# Patient Record
Sex: FEMALE | Race: WHITE | Employment: OTHER | URBAN - METROPOLITAN AREA
[De-identification: names, ages, dates, MRNs, and addresses within clinical notes are randomized per-mention and may not be internally consistent; named-entity substitution may affect disease eponyms.]

---

## 2018-06-07 ENCOUNTER — HOSPITAL ENCOUNTER (EMERGENCY)
Age: 83
Discharge: HOME | End: 2018-06-08
Payer: MEDICARE

## 2018-06-07 DIAGNOSIS — B36.9 FUNGAL DERMATITIS: ICD-10-CM

## 2018-06-07 DIAGNOSIS — I87.2 CHRONIC VENOUS STASIS DERMATITIS OF BOTH LOWER EXTREMITIES: Primary | ICD-10-CM

## 2018-06-07 DIAGNOSIS — L03.039 ONYCHIA OF TOE, UNSPECIFIED LATERALITY: ICD-10-CM

## 2018-06-07 LAB
ALBUMIN SERPL-MCNC: 3 G/DL (ref 3.2–4.6)
ALBUMIN/GLOB SERPL: 0.7 {RATIO} (ref 1.2–3.5)
ALP SERPL-CCNC: 83 U/L (ref 50–136)
ALT SERPL-CCNC: 17 U/L (ref 12–65)
ANION GAP SERPL CALC-SCNC: 3 MMOL/L (ref 7–16)
AST SERPL-CCNC: 23 U/L (ref 15–37)
BASOPHILS # BLD: 0.1 K/UL (ref 0–0.2)
BASOPHILS NFR BLD: 1 % (ref 0–2)
BILIRUB SERPL-MCNC: 0.2 MG/DL (ref 0.2–1.1)
BUN SERPL-MCNC: 16 MG/DL (ref 8–23)
CALCIUM SERPL-MCNC: 9 MG/DL (ref 8.3–10.4)
CHLORIDE SERPL-SCNC: 106 MMOL/L (ref 98–107)
CO2 SERPL-SCNC: 34 MMOL/L (ref 21–32)
CREAT SERPL-MCNC: 0.7 MG/DL (ref 0.6–1)
D DIMER PPP FEU-MCNC: 1.75 UG/ML(FEU)
DIFFERENTIAL METHOD BLD: ABNORMAL
EOSINOPHIL # BLD: 0.2 K/UL (ref 0–0.8)
EOSINOPHIL NFR BLD: 2 % (ref 0.5–7.8)
ERYTHROCYTE [DISTWIDTH] IN BLOOD BY AUTOMATED COUNT: 13.8 % (ref 11.9–14.6)
GLOBULIN SER CALC-MCNC: 4.1 G/DL (ref 2.3–3.5)
GLUCOSE SERPL-MCNC: 115 MG/DL (ref 65–100)
HCT VFR BLD AUTO: 37.9 % (ref 35.8–46.3)
HGB BLD-MCNC: 11.7 G/DL (ref 11.7–15.4)
IMM GRANULOCYTES # BLD: 0 K/UL (ref 0–0.5)
IMM GRANULOCYTES NFR BLD AUTO: 0 % (ref 0–5)
LYMPHOCYTES # BLD: 1.8 K/UL (ref 0.5–4.6)
LYMPHOCYTES NFR BLD: 24 % (ref 13–44)
MCH RBC QN AUTO: 27.2 PG (ref 26.1–32.9)
MCHC RBC AUTO-ENTMCNC: 30.9 G/DL (ref 31.4–35)
MCV RBC AUTO: 88.1 FL (ref 79.6–97.8)
MONOCYTES # BLD: 0.7 K/UL (ref 0.1–1.3)
MONOCYTES NFR BLD: 9 % (ref 4–12)
NEUTS SEG # BLD: 4.8 K/UL (ref 1.7–8.2)
NEUTS SEG NFR BLD: 64 % (ref 43–78)
PLATELET # BLD AUTO: 275 K/UL (ref 150–450)
PMV BLD AUTO: 9.9 FL (ref 10.8–14.1)
POTASSIUM SERPL-SCNC: 3.9 MMOL/L (ref 3.5–5.1)
PROT SERPL-MCNC: 7.1 G/DL (ref 6.3–8.2)
RBC # BLD AUTO: 4.3 M/UL (ref 4.05–5.25)
SODIUM SERPL-SCNC: 143 MMOL/L (ref 136–145)
WBC # BLD AUTO: 7.5 K/UL (ref 4.3–11.1)

## 2018-06-07 PROCEDURE — 99285 EMERGENCY DEPT VISIT HI MDM: CPT

## 2018-06-07 PROCEDURE — 80053 COMPREHEN METABOLIC PANEL: CPT | Performed by: EMERGENCY MEDICINE

## 2018-06-07 PROCEDURE — 85379 FIBRIN DEGRADATION QUANT: CPT

## 2018-06-07 PROCEDURE — 85025 COMPLETE CBC W/AUTO DIFF WBC: CPT | Performed by: EMERGENCY MEDICINE

## 2018-06-07 PROCEDURE — 81003 URINALYSIS AUTO W/O SCOPE: CPT

## 2018-06-08 ENCOUNTER — APPOINTMENT (OUTPATIENT)
Dept: ULTRASOUND IMAGING | Age: 83
End: 2018-06-08
Payer: MEDICARE

## 2018-06-08 VITALS
OXYGEN SATURATION: 93 % | HEART RATE: 88 BPM | TEMPERATURE: 97.6 F | RESPIRATION RATE: 16 BRPM | SYSTOLIC BLOOD PRESSURE: 112 MMHG | DIASTOLIC BLOOD PRESSURE: 60 MMHG

## 2018-06-08 PROCEDURE — 93970 EXTREMITY STUDY: CPT

## 2018-06-08 RX ORDER — CEPHALEXIN 500 MG/1
500 CAPSULE ORAL 4 TIMES DAILY
Qty: 28 CAP | Refills: 0 | Status: SHIPPED | OUTPATIENT
Start: 2018-06-08 | End: 2018-06-15

## 2018-06-08 RX ORDER — FLUCONAZOLE 150 MG/1
150 TABLET ORAL
Qty: 2 TAB | Refills: 0 | Status: SHIPPED | OUTPATIENT
Start: 2018-06-08 | End: 2018-06-08

## 2018-06-08 NOTE — DISCHARGE INSTRUCTIONS
Cellulitis: Care Instructions  Your Care Instructions    Cellulitis is a skin infection. It often occurs after a break in the skin from a scrape, cut, bite, or puncture, or after a rash. The doctor has checked you carefully, but problems can develop later. If you notice any problems or new symptoms, get medical treatment right away. Follow-up care is a key part of your treatment and safety. Be sure to make and go to all appointments, and call your doctor if you are having problems. It's also a good idea to know your test results and keep a list of the medicines you take. How can you care for yourself at home? · Take your antibiotics as directed. Do not stop taking them just because you feel better. You need to take the full course of antibiotics. · Prop up the infected area on pillows to reduce pain and swelling. Try to keep the area above the level of your heart as often as you can. · If your doctor told you how to care for your wound, follow your doctor's instructions. If you did not get instructions, follow this general advice:  ¨ Wash the wound with clean water 2 times a day. Don't use hydrogen peroxide or alcohol, which can slow healing. ¨ You may cover the wound with a thin layer of petroleum jelly, such as Vaseline, and a nonstick bandage. ¨ Apply more petroleum jelly and replace the bandage as needed. · Be safe with medicines. Take pain medicines exactly as directed. ¨ If the doctor gave you a prescription medicine for pain, take it as prescribed. ¨ If you are not taking a prescription pain medicine, ask your doctor if you can take an over-the-counter medicine. To prevent cellulitis in the future  · Try to prevent cuts, scrapes, or other injuries to your skin. Cellulitis most often occurs where there is a break in the skin. · If you get a scrape, cut, mild burn, or bite, wash the wound with clean water as soon as you can to help avoid infection.  Don't use hydrogen peroxide or alcohol, which can slow healing. · If you have swelling in your legs (edema), support stockings and good skin care may help prevent leg sores and cellulitis. · Take care of your feet, especially if you have diabetes or other conditions that increase the risk of infection. Wear shoes and socks. Do not go barefoot. If you have athlete's foot or other skin problems on your feet, talk to your doctor about how to treat them. When should you call for help? Call your doctor now or seek immediate medical care if:  ? · You have signs that your infection is getting worse, such as:  ¨ Increased pain, swelling, warmth, or redness. ¨ Red streaks leading from the area. ¨ Pus draining from the area. ¨ A fever. ? · You get a rash. ? Watch closely for changes in your health, and be sure to contact your doctor if:  ? · You are not getting better after 1 day (24 hours). ? · You do not get better as expected. Where can you learn more? Go to http://millie-patrice.info/. Parish Brown in the search box to learn more about \"Cellulitis: Care Instructions. \"  Current as of: October 13, 2016  Content Version: 11.4  © 9789-7365 [employer]. Care instructions adapted under license by [employer] (which disclaims liability or warranty for this information). If you have questions about a medical condition or this instruction, always ask your healthcare professional. [place] [address] any warranty or liability for your use of this information.

## 2018-06-08 NOTE — ED NOTES
I have reviewed discharge instructions with the patient. The patient verbalized understanding. Patient left ED via Discharge Method: stretcher to Home with (, transport). Opportunity for questions and clarification provided. Patient given 2 scripts. To continue your aftercare when you leave the hospital, you may receive an automated call from our care team to check in on how you are doing. This is a free service and part of our promise to provide the best care and service to meet your aftercare needs.  If you have questions, or wish to unsubscribe from this service please call [phone]. Thank you for Choosing our [facility].

## 2018-06-08 NOTE — ED NOTES
Pt presents to the ED for bilateral leg pain and swelling for several days.  Pt states that she is having a difficulty time ambulating

## 2018-06-08 NOTE — ED PROVIDER NOTES
HPI Comments: 80-year-old female complaining of lower extremity pain and headache. Patient states that she's had a lower extremity pain first several days if not weeks. She is also noted swelling and redness to her lower extremities. There is long-term vascular insufficiency findings on her legs. Patient also complains of a headache which she's had for several days. No reported treatment at home. Patient lives with her brother in private home in [facility]. No recent falls or injuries no reported fevers or chills no nausea vomiting or diarrhea shortness of breath. Patient is a 80 y.o. female presenting with leg pain and headaches. The history is provided by the patient. Leg Pain    This is a new problem. The current episode started more than 1 week ago. The problem occurs constantly. The problem has not changed since onset. The pain is present in the left lower leg, left ankle, left foot, right ankle, right foot and right lower leg. The quality of the pain is described as aching. She has tried nothing for the symptoms. There has been no history of extremity trauma. Headache           No past medical history on file. No past surgical history on file. No family history on file. Social History     Social History    Marital status: N/A     Spouse name: N/A    Number of children: N/A    Years of education: N/A     Occupational History    Not on file. Social History Main Topics    Smoking status: Not on file    Smokeless tobacco: Not on file    Alcohol use Not on file    Drug use: Not on file    Sexual activity: Not on file     Other Topics Concern    Not on file     Social History Narrative         ALLERGIES: Review of patient's allergies indicates no known allergies. Review of Systems   Constitutional: Negative. Negative for activity change. HENT: Negative. Eyes: Negative. Respiratory: Negative. Cardiovascular: Negative. Gastrointestinal: Negative.     Genitourinary: Negative. Musculoskeletal: Negative. Skin: Negative. Neurological: Positive for headaches. Psychiatric/Behavioral: Negative. All other systems reviewed and are negative. Vitals:    06/07/18 2241   BP: 169/69   Pulse: 89   Resp: 20   Temp: 97.6 °F (36.4 °C)   SpO2: 99%            Physical Exam   Constitutional: She is oriented to person, place, and time. She appears well-developed and well-nourished. No distress. HENT:   Head: Normocephalic and atraumatic. Right Ear: External ear normal.   Left Ear: External ear normal.   Nose: Nose normal.   Mouth/Throat: Oropharynx is clear and moist. No oropharyngeal exudate. Eyes: Conjunctivae and EOM are normal. Pupils are equal, round, and reactive to light. Right eye exhibits no discharge. Left eye exhibits no discharge. No scleral icterus. Neck: Normal range of motion. Neck supple. No JVD present. No tracheal deviation present. Cardiovascular: Normal rate, regular rhythm and intact distal pulses. Pulmonary/Chest: Effort normal and breath sounds normal. No stridor. No respiratory distress. She has no wheezes. She exhibits no tenderness. Abdominal: Soft. Bowel sounds are normal. She exhibits no distension and no mass. There is no tenderness. Musculoskeletal: Normal range of motion. She exhibits no edema or tenderness. Right ankle: She exhibits swelling. She exhibits normal pulse. Left ankle: She exhibits swelling. She exhibits normal pulse. Feet:    Neurological: She is alert and oriented to person, place, and time. No cranial nerve deficit. Skin: Skin is warm and dry. No rash noted. She is not diaphoretic. No erythema. No pallor. Psychiatric: She has a normal mood and affect. Her behavior is normal. Thought content normal.   Nursing note and vitals reviewed.        MDM  Number of Diagnoses or Management Options  Diagnosis management comments: Assessment: Chronic dermatitis of the lower extremities possible fungal component and cellulitis. Plan: Antibiotics antifungals close follow-up primary care physician.        Amount and/or Complexity of Data Reviewed  Clinical lab tests: ordered and reviewed  Tests in the radiology section of CPT®: ordered and reviewed  Tests in the medicine section of CPT®: ordered and reviewed          ED Course       Procedures

## 2018-06-08 NOTE — ED TRIAGE NOTES
Pt c/o bilateral leg pain, swelling and redness , pt also c./o terrible headache that started this evening.  Pt denies any medical problems or surgeries

## 2018-07-04 ENCOUNTER — APPOINTMENT (OUTPATIENT)
Dept: GENERAL RADIOLOGY | Age: 83
End: 2018-07-04
Attending: EMERGENCY MEDICINE
Payer: MEDICARE

## 2018-07-04 ENCOUNTER — HOSPITAL ENCOUNTER (EMERGENCY)
Age: 83
Discharge: HOME | End: 2018-07-04
Attending: EMERGENCY MEDICINE
Payer: MEDICARE

## 2018-07-04 VITALS
HEART RATE: 94 BPM | BODY MASS INDEX: 20.49 KG/M2 | WEIGHT: 120 LBS | DIASTOLIC BLOOD PRESSURE: 71 MMHG | SYSTOLIC BLOOD PRESSURE: 159 MMHG | OXYGEN SATURATION: 100 % | TEMPERATURE: 98.5 F | HEIGHT: 64 IN | RESPIRATION RATE: 20 BRPM

## 2018-07-04 DIAGNOSIS — L30.9 CHRONIC DERMATITIS: ICD-10-CM

## 2018-07-04 DIAGNOSIS — S20.211A RIB CONTUSION, RIGHT, INITIAL ENCOUNTER: Primary | ICD-10-CM

## 2018-07-04 DIAGNOSIS — R03.0 ELEVATED BLOOD PRESSURE READING: ICD-10-CM

## 2018-07-04 LAB
ALBUMIN SERPL-MCNC: 3.1 G/DL (ref 3.2–4.6)
ALBUMIN/GLOB SERPL: 0.7 {RATIO} (ref 1.2–3.5)
ALP SERPL-CCNC: 113 U/L (ref 50–136)
ALT SERPL-CCNC: 80 U/L (ref 12–65)
ANION GAP SERPL CALC-SCNC: 7 MMOL/L (ref 7–16)
APPEARANCE UR: CLEAR
AST SERPL-CCNC: 162 U/L (ref 15–37)
ATRIAL RATE: 87 BPM
BACTERIA URNS QL MICRO: 0 /HPF
BILIRUB SERPL-MCNC: 0.3 MG/DL (ref 0.2–1.1)
BILIRUB UR QL: NEGATIVE
BUN SERPL-MCNC: 15 MG/DL (ref 8–23)
CALCIUM SERPL-MCNC: 9.2 MG/DL (ref 8.3–10.4)
CALCULATED P AXIS, ECG09: 76 DEGREES
CALCULATED R AXIS, ECG10: -25 DEGREES
CALCULATED T AXIS, ECG11: 31 DEGREES
CHLORIDE SERPL-SCNC: 101 MMOL/L (ref 98–107)
CK SERPL-CCNC: 83 U/L (ref 21–215)
CO2 SERPL-SCNC: 30 MMOL/L (ref 21–32)
COLOR UR: YELLOW
CREAT SERPL-MCNC: 0.65 MG/DL (ref 0.6–1)
DIAGNOSIS, 93000: NORMAL
EPI CELLS #/AREA URNS HPF: ABNORMAL /HPF
ERYTHROCYTE [DISTWIDTH] IN BLOOD BY AUTOMATED COUNT: 14 % (ref 11.9–14.6)
GLOBULIN SER CALC-MCNC: 4.5 G/DL (ref 2.3–3.5)
GLUCOSE SERPL-MCNC: 110 MG/DL (ref 65–100)
GLUCOSE UR STRIP.AUTO-MCNC: NEGATIVE MG/DL
HCT VFR BLD AUTO: 38.6 % (ref 35.8–46.3)
HGB BLD-MCNC: 12.3 G/DL (ref 11.7–15.4)
HGB UR QL STRIP: ABNORMAL
KETONES UR QL STRIP.AUTO: ABNORMAL MG/DL
LEUKOCYTE ESTERASE UR QL STRIP.AUTO: ABNORMAL
MCH RBC QN AUTO: 27.5 PG (ref 26.1–32.9)
MCHC RBC AUTO-ENTMCNC: 31.9 G/DL (ref 31.4–35)
MCV RBC AUTO: 86.2 FL (ref 79.6–97.8)
NITRITE UR QL STRIP.AUTO: NEGATIVE
OTHER OBSERVATIONS,UCOM: ABNORMAL
P-R INTERVAL, ECG05: 166 MS
PH UR STRIP: 7 [PH] (ref 5–9)
PLATELET # BLD AUTO: 278 K/UL (ref 150–450)
PMV BLD AUTO: 9.7 FL (ref 10.8–14.1)
POTASSIUM SERPL-SCNC: 3.6 MMOL/L (ref 3.5–5.1)
PROT SERPL-MCNC: 7.6 G/DL (ref 6.3–8.2)
PROT UR STRIP-MCNC: NEGATIVE MG/DL
Q-T INTERVAL, ECG07: 374 MS
QRS DURATION, ECG06: 82 MS
QTC CALCULATION (BEZET), ECG08: 450 MS
RBC # BLD AUTO: 4.48 M/UL (ref 4.05–5.25)
RBC #/AREA URNS HPF: ABNORMAL /HPF
SODIUM SERPL-SCNC: 138 MMOL/L (ref 136–145)
SP GR UR REFRACTOMETRY: 1.01 (ref 1–1.02)
UROBILINOGEN UR QL STRIP.AUTO: 0.2 EU/DL (ref 0.2–1)
VENTRICULAR RATE, ECG03: 87 BPM
WBC # BLD AUTO: 10 K/UL (ref 4.3–11.1)
WBC URNS QL MICRO: ABNORMAL /HPF

## 2018-07-04 PROCEDURE — 93005 ELECTROCARDIOGRAM TRACING: CPT | Performed by: EMERGENCY MEDICINE

## 2018-07-04 PROCEDURE — 80053 COMPREHEN METABOLIC PANEL: CPT | Performed by: EMERGENCY MEDICINE

## 2018-07-04 PROCEDURE — 82550 ASSAY OF CK (CPK): CPT | Performed by: EMERGENCY MEDICINE

## 2018-07-04 PROCEDURE — 74011250637 HC RX REV CODE- 250/637: Performed by: EMERGENCY MEDICINE

## 2018-07-04 PROCEDURE — 71101 X-RAY EXAM UNILAT RIBS/CHEST: CPT

## 2018-07-04 PROCEDURE — 81001 URINALYSIS AUTO W/SCOPE: CPT | Performed by: EMERGENCY MEDICINE

## 2018-07-04 PROCEDURE — 99285 EMERGENCY DEPT VISIT HI MDM: CPT | Performed by: EMERGENCY MEDICINE

## 2018-07-04 PROCEDURE — 85027 COMPLETE CBC AUTOMATED: CPT | Performed by: EMERGENCY MEDICINE

## 2018-07-04 RX ORDER — SODIUM CHLORIDE 0.9 % (FLUSH) 0.9 %
5-10 SYRINGE (ML) INJECTION AS NEEDED
Status: DISCONTINUED | OUTPATIENT
Start: 2018-07-04 | End: 2018-07-04 | Stop reason: HOSPADM

## 2018-07-04 RX ORDER — ACETAMINOPHEN 325 MG/1
650 TABLET ORAL
Status: COMPLETED | OUTPATIENT
Start: 2018-07-04 | End: 2018-07-04

## 2018-07-04 RX ORDER — AMLODIPINE BESYLATE 5 MG/1
5 TABLET ORAL DAILY
Qty: 30 TAB | Refills: 0 | Status: SHIPPED | OUTPATIENT
Start: 2018-07-04

## 2018-07-04 RX ORDER — SODIUM CHLORIDE 0.9 % (FLUSH) 0.9 %
5-10 SYRINGE (ML) INJECTION EVERY 8 HOURS
Status: DISCONTINUED | OUTPATIENT
Start: 2018-07-04 | End: 2018-07-04 | Stop reason: HOSPADM

## 2018-07-04 RX ADMIN — ACETAMINOPHEN 650 MG: 325 TABLET ORAL at 10:57

## 2018-07-04 NOTE — ED TRIAGE NOTES
Patient arrives from home with EMS with complaint of fall. Patient was sitting on toilet having bowel movement when she fell over to the right hitting her right side ribs on the tub side. Did not hit head.

## 2018-07-04 NOTE — DISCHARGE INSTRUCTIONS
Chest Contusion: Care Instructions  Your Care Instructions  A chest contusion, or bruise, is caused by a fall or direct blow to the chest. Car crashes, falls, getting punched, and injury from bicycle handlebars are common causes of chest contusions. A very forceful blow to the chest can injure the heart or blood vessels in the chest, the lungs, the airway, the liver, or the spleen. Pain may be caused by an injury to muscles, cartilage, or ribs. Deep breathing, coughing, or sneezing can increase your pain. Lying on the injured area also can cause pain. Follow-up care is a key part of your treatment and safety. Be sure to make and go to all appointments, and call your doctor if you are having problems. It's also a good idea to know your test results and keep a list of the medicines you take. How can you care for yourself at home? · Rest and protect the injured or sore area. Stop, change, or take a break from any activity that may be causing your pain. · Put ice or a cold pack on the area for 10 to 20 minutes at a time. Put a thin cloth between the ice and your skin. · After 2 or 3 days, if your swelling is gone, apply a heating pad set on low or a warm cloth to your chest. Some doctors suggest that you go back and forth between hot and cold. Put a thin cloth between the heating pad and your skin. · Do not wrap or tape your ribs for support. This may cause you to take smaller breaths, which could increase your risk of pneumonia and lung collapse. · Ask your doctor if you can take an over-the-counter pain medicine, such as acetaminophen (Tylenol), ibuprofen (Advil, Motrin), or naproxen (Aleve). Be safe with medicines. Read and follow all instructions on the label. · Take your medicines exactly as prescribed. Call your doctor if you think you are having a problem with your medicine.   · Gentle stretching and massage may help you feel better after a few days of rest. Stretch slowly to the point just before discomfort begins, then hold the stretch for at least 15 to 30 seconds. Do this 3 or 4 times per day. · As your pain gets better, slowly return to your normal activities. Be patient, because chest bruises can take weeks or months to heal. Any increased pain may be a sign that you need to rest a while longer. When should you call for help? Call 911 anytime you think you may need emergency care. For example, call if:  ? · You have severe trouble breathing. ? · You cough up blood. ?Call your doctor now or seek immediate medical care if:  ? · You have belly pain. ? · You are dizzy or lightheaded, or you feel like you may faint. ? · You develop new symptoms with the chest pain. ? · Your chest pain gets worse. ? · You have a fever. ? · You have some shortness of breath. ? · You have a cough that brings up mucus from the lungs. ? Watch closely for changes in your health, and be sure to contact your doctor if:  ? · Your chest pain is not improving after 1 week. Where can you learn more? Go to http://millie-patrice.info/. Enter I174 in the search box to learn more about \"Chest Contusion: Care Instructions. \"  Current as of: March 20, 2017  Content Version: 11.4  © 4680-1090 [employer]. Care instructions adapted under license by [employer] (which disclaims liability or warranty for this information). If you have questions about a medical condition or this instruction, always ask your healthcare professional. [place] [address] any warranty or liability for your use of this information. Elevated Blood Pressure: Care Instructions  Your Care Instructions    Blood pressure is a measure of how hard the blood pushes against the walls of your arteries. It's normal for blood pressure to go up and down throughout the day. But if it stays up over time, you have high blood pressure. Two numbers tell you your blood pressure.  The first number is the systolic pressure. It shows how hard the blood pushes when your heart is pumping. The second number is the diastolic pressure. It shows how hard the blood pushes between heartbeats, when your heart is relaxed and filling with blood. An ideal blood pressure in adults is less than 120/80 (say \"120 over 80\"). High blood pressure is 140/90 or higher. You have high blood pressure if your top number is 140 or higher or your bottom number is 90 or higher, or both. The main test for high blood pressure is simple, fast, and painless. To diagnose high blood pressure, your doctor will test your blood pressure at different times. After testing your blood pressure, your doctor may ask you to test it again when you are home. If you are diagnosed with high blood pressure, you can work with your doctor to make a long-term plan to manage it. Follow-up care is a key part of your treatment and safety. Be sure to make and go to all appointments, and call your doctor if you are having problems. It's also a good idea to know your test results and keep a list of the medicines you take. How can you care for yourself at home? · Do not smoke. Smoking increases your risk for heart attack and stroke. If you need help quitting, talk to your doctor about stop-smoking programs and medicines. These can increase your chances of quitting for good. · Stay at a healthy weight. · Try to limit how much sodium you eat to less than 2,300 milligrams (mg) a day. Your doctor may ask you to try to eat less than 1,500 mg a day. · Be physically active. Get at least 30 minutes of exercise on most days of the week. Walking is a good choice. You also may want to do other activities, such as running, swimming, cycling, or playing tennis or team sports. · Avoid or limit alcohol. Talk to your doctor about whether you can drink any alcohol. · Eat plenty of fruits, vegetables, and low-fat dairy products. Eat less saturated and total fats.   · Learn how to check your blood pressure at home. When should you call for help? Call your doctor now or seek immediate medical care if:  ? · Your blood pressure is much higher than normal (such as 180/110 or higher). ? · You think high blood pressure is causing symptoms such as:  ¨ Severe headache. ¨ Blurry vision. ? Watch closely for changes in your health, and be sure to contact your doctor if:  ? · You do not get better as expected. Where can you learn more? Go to http://millie-patrice.info/. Enter J734 in the search box to learn more about \"Elevated Blood Pressure: Care Instructions. \"  Current as of: September 21, 2016  Content Version: 11.4  © 4012-3443 [employer]. Care instructions adapted under license by [employer] (which disclaims liability or warranty for this information). If you have questions about a medical condition or this instruction, always ask your healthcare professional. [place] [address] any warranty or liability for your use of this information. Dermatitis: Care Instructions  Your Care Instructions  Dermatitis is the general name used for any rash or inflammation of the skin. Different kinds of dermatitis cause different kinds of rashes. Common causes of a rash include new medicines, plants (such as poison oak or poison ivy), heat, and stress. Certain illnesses can also cause a rash. An allergic reaction to something that touches your skin, such as latex, nickel, or poison ivy, is called contact dermatitis. Contact dermatitis may also be caused by something that irritates the skin, such as bleach, a chemical, or soap. These types of rashes cannot be spread from person to person. How long your rash will last depends on what caused it. Rashes may last a few days or months. Follow-up care is a key part of your treatment and safety. Be sure to make and go to all appointments, and call your doctor if you are having problems.  It's also a good idea to know your test results and keep a list of the medicines you take. How can you care for yourself at home? · Do not scratch the rash. Cut your nails short, and file them smooth. Or wear gloves if this helps keep you from scratching. · Wash the area with water only. Pat dry. · Put cold, wet cloths on the rash to reduce itching. · Keep cool, and stay out of the sun. · Leave the rash open to the air as much as possible. · If the rash itches, use hydrocortisone cream. Follow the directions on the label. Calamine lotion may help for plant rashes. · Take an over-the-counter antihistamine, such as diphenhydramine (Benadryl) or loratadine (Claritin), to help calm the itching. Read and follow all instructions on the label. · If your doctor prescribed a cream, use it as directed. If your doctor prescribed medicine, take it exactly as directed. When should you call for help? Call your doctor now or seek immediate medical care if:  ? · You have symptoms of infection, such as:  ¨ Increased pain, swelling, warmth, or redness. ¨ Red streaks leading from the area. ¨ Pus draining from the area. ¨ A fever. ? · You have joint pain along with the rash. ? Watch closely for changes in your health, and be sure to contact your doctor if:  ? · Your rash is changing or getting worse. ? · You are not getting better as expected. Where can you learn more? Go to http://millie-patrice.info/. Enter [phone] [id number] in the search box to learn more about \"Dermatitis: Care Instructions. \"  Current as of: October 13, 2016  Content Version: 11.4  © 4809-4630 [employer]. Care instructions adapted under license by [employer] (which disclaims liability or warranty for this information).  If you have questions about a medical condition or this instruction, always ask your healthcare professional. [address]  any warranty or liability for your use of this information.

## 2018-07-04 NOTE — ED NOTES
Patient report given to [employer]. All questions answered. Patient was transferred from ER bed to ambulance stretcher. Patient care transferred.

## 2018-07-04 NOTE — ED NOTES
[facility] has been called to transport patient back home. They state that it will be approximately 30 minutes.

## 2018-07-04 NOTE — ED PROVIDER NOTES
Patient is a 80 y.o. female presenting with fall. The history is provided by the patient. Fall   The accident occurred 3 to 5 hours ago. Fall occurred: sitting on the toilet. She fell from a height of 1 - 2 ft. Impact surface: edge of the bathtub. Point of impact: right anterolateral rib area. Pain location: right ribs. The pain is at a severity of 6/10. She was not ambulatory at the scene. There was entrapment (patient thinks she was only entrapped for maybe 30 minutes, but states she thought it occurred at around 5:30 in the morning) after the fall. There was no drug use involved in the accident. There was no alcohol use involved in the accident. Pertinent negatives include no numbness, no abdominal pain, no nausea, no vomiting, no headaches and no loss of consciousness. The symptoms are aggravated by activity. She has tried nothing for the symptoms. Past Medical History:   Diagnosis Date    Gastrointestinal disorder     GERD    Ill-defined condition     cellulitis of legs       History reviewed. No pertinent surgical history. History reviewed. No pertinent family history. Social History     Social History    Marital status: UNKNOWN     Spouse name: N/A    Number of children: N/A    Years of education: N/A     Occupational History    Not on file. Social History Main Topics    Smoking status: Never Smoker    Smokeless tobacco: Never Used    Alcohol use No    Drug use: No    Sexual activity: Not on file     Other Topics Concern    Not on file     Social History Narrative    No narrative on file         ALLERGIES: Review of patient's allergies indicates no known allergies. Review of Systems   HENT: Negative for facial swelling. Respiratory: Negative for shortness of breath. Cardiovascular: Negative for chest pain. Gastrointestinal: Negative for abdominal pain, nausea and vomiting. Musculoskeletal: Negative for back pain and neck pain.    Neurological: Negative for loss of consciousness, weakness, numbness and headaches. Vitals:    07/04/18 0948   BP: 200/81   Pulse: 93   Resp: 18   Temp: 98.7 °F (37.1 °C)   SpO2: 99%   Weight: 54.4 kg (120 lb)   Height: 5' 4\" (1.626 m)            Physical Exam   Constitutional: She is oriented to person, place, and time. Patient appears disheveled there is what appears to be food particles in her hair   HENT:   Head: Normocephalic and atraumatic. Eyes: Conjunctivae and EOM are normal. Pupils are equal, round, and reactive to light. Neck: Trachea normal. Neck supple. No spinous process tenderness and no muscular tenderness present. Cardiovascular: Normal rate, regular rhythm, normal heart sounds and intact distal pulses. Pulmonary/Chest: Effort normal and breath sounds normal. She exhibits no tenderness. Abdominal: Soft. Bowel sounds are normal. She exhibits no distension. There is no tenderness. There is no rebound and no guarding. Musculoskeletal: Normal range of motion. She exhibits edema. Cervical back: She exhibits tenderness. Chronic appearing edema and chronic dermatitis. There is a dorsal left foot wound which is caked and covered with paper towels. No warmth or erythema. No purulent discharge. There is dark scabbing material at the periphery. Lymphadenopathy:     She has no cervical adenopathy. Neurological: She is alert and oriented to person, place, and time. She has normal strength. No sensory deficit. GCS eye subscore is 4. GCS verbal subscore is 5. GCS motor subscore is 6. Skin: Skin is warm and dry. Nursing note and vitals reviewed. MDM  Number of Diagnoses or Management Options  Diagnosis management comments: Patient is hypertensive upon arrival.  She is disheveled. She is oriented ×3 however. Her story of falling at 5:30 in the morning but arriving here after 9:30 AM and only laying on the floor for 30 minutes does not quite add up so I will evaluate for rhabdomyolysis.   Evaluate for end organ damage given his systolic pressure of 384. May need social work consult for  to evaluate the living circumstances given her disheveled appearance. 12:31 PM  No UTI. Home health comes out for 8 hours a day 4 hours in the morning and 4 hours in the evening 7 days a week. Patient has recently started refusing their help. They are primarily there to help her brother and his wife but do help her. She is seen a doctor in the past who is treating her for  Skin disorder fungal infection but she has refused to go. She has recently had a palliative care consult at the home as well. Social work will help us find primary care follow-up for blood pressure recheck is I will prescribe her blood pressure medicine. No rib fractures And no pneumothorax. Tylenol for pain and ice to the sore area will be recommended.        Amount and/or Complexity of Data Reviewed  Clinical lab tests: ordered and reviewed (Results for orders placed or performed during the hospital encounter of 07/04/18  -CBC W/O DIFF       Result                                            Value                         Ref Range                       WBC                                               10.0                          4.3 - 11.1 K/uL                 RBC                                               4.48                          4.05 - 5.25 M/uL                HGB                                               12.3                          11.7 - 15.4 g/dL                HCT                                               38.6                          35.8 - 46.3 %                   MCV                                               86.2                          79.6 - 97.8 FL                  MCH                                               27.5                          26.1 - 32.9 PG                  MCHC                                              31.9                          31.4 - 35.0 g/dL                RDW 14.0                          11.9 - 14.6 %                   PLATELET                                          278                           150 - 450 K/uL                  MPV                                               9.7 (L)                       10.8 - 14.1 FL             -METABOLIC PANEL, COMPREHENSIVE       Result                                            Value                         Ref Range                       Sodium                                            138                           136 - 145 mmol/L                Potassium                                         3.6                           3.5 - 5.1 mmol/L                Chloride                                          101                           98 - 107 mmol/L                 CO2                                               30                            21 - 32 mmol/L                  Anion gap                                         7                             7 - 16 mmol/L                   Glucose                                           110 (H)                       65 - 100 mg/dL                  BUN                                               15                            8 - 23 MG/DL                    Creatinine                                        0.65                          0.6 - 1.0 MG/DL                 GFR est AA                                        >60                           >60 ml/min/1.73m2               GFR est non-AA                                    >60                           >60 ml/min/1.73m2               Calcium                                           9.2                           8.3 - 10.4 MG/DL                Bilirubin, total                                  0.3                           0.2 - 1.1 MG/DL                 ALT (SGPT)                                        80 (H)                        12 - 65 U/L                     AST (SGOT) 162 (H)                       15 - 37 U/L                     Alk. phosphatase                                  113                           50 - 136 U/L                    Protein, total                                    7.6                           6.3 - 8.2 g/dL                  Albumin                                           3.1 (L)                       3.2 - 4.6 g/dL                  Globulin                                          4.5 (H)                       2.3 - 3.5 g/dL                  A-G Ratio                                         0.7 (L)                       1.2 - 3.5                  -CK       Result                                            Value                         Ref Range                       CK                                                83                            21 - 215 U/L               -URINALYSIS W/ RFLX MICROSCOPIC       Result                                            Value                         Ref Range                       Color                                             YELLOW                                                        Appearance                                        CLEAR                                                         Specific gravity                                  1.012                         1.001 - 1.023                   pH (UA)                                           7.0                           5.0 - 9.0                       Protein                                           NEGATIVE                      NEG mg/dL                       Glucose                                           NEGATIVE                      mg/dL                           Ketone                                            TRACE (A)                     NEG mg/dL                       Bilirubin                                         NEGATIVE                      NEG                             Blood SMALL (A)                     NEG                             Urobilinogen                                      0.2                           0.2 - 1.0 EU/dL                 Nitrites                                          NEGATIVE                      NEG                             Leukocyte Esterase                                SMALL (A)                     NEG                             WBC                                               0-3                           0 /hpf                          RBC                                               0-3                           0 /hpf                          Epithelial cells                                  0-3                           0 /hpf                          Bacteria                                          0                             0 /hpf                          Other observations                                RESULTS VERIFIED MANUALLY                                -EKG, 12 LEAD, INITIAL       Result                                            Value                         Ref Range                       Ventricular Rate                                  87                            BPM                             Atrial Rate                                       87                            BPM                             P-R Interval                                      166                           ms                              QRS Duration                                      82                            ms                              Q-T Interval                                      374                           ms                              QTC Calculation (Bezet)                           450                           ms                              Calculated P Axis                                 76                            degrees                         Calculated R Axis                                 -25 degrees                         Calculated T Axis                                 31                            degrees                         Diagnosis                                                                                                   Normal sinus rhythm   Normal ECG   No previous ECGs available   Confirmed by VASQUEZ MCMULLEN (), Guy Ahuja ([id number]) on 7/4/2018 11:17:51 AM     )  Tests in the radiology section of CPT®: ordered and reviewed (Xr Ribs Rt W Pa Cxr Min 3 V    Result Date: 7/4/2018  Right rib radiographs, 7/4/2018 History: Right rib pain after fall this morning. Technique: Frontal view of the chest and dedicated views of the right ribs. Findings: A frontal view of the chest is included. The heart is moderately enlarged of uncertain acuity. The lungs are expanded without pneumothorax. No consolidation, or pleural effusion is seen. Dedicated views of the right ribs are submitted for evaluation. Assessment is somewhat limited given that a skin marker was not placed indicating the level of the patient's pain. In general, no definite displaced rib fracture is seen. Impression: 1. No acute right rib abnormality evident on today's study.      )          ED Course       Procedures

## 2018-07-04 NOTE — PROGRESS NOTES
Raquel Gallardo RN in from [place] ([phone]) who confirms patient lives at home with her brother Osmin Almaguer and her brother's wife [facility]. States they are clients of the brother and his wife who have a long-term care policy. hospitals they have a POA named Ivan Wade who set it up for them and they are in the home 4 hours in the morning and 4 hours in the evening. States they cook, provide 3 meals a day, help with bathing and whatever else they need. hospitals patient walks independently, 'toilets' independently and whenever she is hungry she will get up and go in the kitchen and make herself something to eat. hospitals they are from the [place] and that her brother was a  and both he and patient are both very 'cognitively intact'. hospitals patient used to go get her hair done once a week with her sister-in-law but now is refusing. hospitals although they are clients of the brother and his wife they offer to help patient all the time but she consistently says \"no thank you\". hospitals the patient spends a lot of time in her bed with her cat reading. hospitals she thinks she just wants to die peacefully as she has said in the past when she tried to get her to come out of her room \"I'm just waiting for the Benjamin\". 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 hospitals her own mother had the same thing and went to a podiatrist every 3 months to get an acid-like substance put on that \"eats the fungus\". hospitals patient is not interested in doing this but she does not think it bothers her. hospitals they did had a company come in 3 months ago to do a palliative care assessment but they said that she needed to go see a PCP and get a baseline assessment before they could do anything and the patient refused to go. hospitals her brother has multiple doctors appointments and they transport him and his wife whenever they need to go anywhere.    brother goes to [facility] and he has an appointment there next week. Notified Dr. Carmencita Dahl wants PCP follow-up for patient and she states they are happy to bring the patient as well if I can arrange to get her appointment at the same time. Call to [facility] but they are closed for the holiday. Will try again tomorrow.  Dr Carmencita Dahl aware of above

## 2018-07-04 NOTE — PROGRESS NOTES
Visited with the patient. Received awake and alert ×4 resting in bed. States she lives at home with her brother Olamide Day ([phone]) and his wife who are also in their 80s. States she is independent in all her activities of daily living and only uses a cane for ambulation assistance. States [facility] comes to their house every day to help them. Call to brother Olamide Day who confirms that patient is independent in her activities of daily living. States El Garibay comes every day for about 4 hours and helps his wife and patient but not him because he does not need it. States he no longer drives because his cardiologist told him that he should not. Call to [facility] and left a message with their answering service.

## 2018-07-04 NOTE — ED NOTES
I have reviewed discharge instructions with the patient. The patient verbalized understanding. Patient left ED via Discharge Method: stretcher to Home with [facility]. Opportunity for questions and clarification provided. Patient given 1 scripts. To continue your aftercare when you leave the hospital, you may receive an automated call from our care team to check in on how you are doing. This is a free service and part of our promise to provide the best care and service to meet your aftercare needs.  If you have questions, or wish to unsubscribe from this service please call [phone]. Thank you for Choosing our [facility].

## 2018-07-08 ENCOUNTER — APPOINTMENT (OUTPATIENT)
Dept: GENERAL RADIOLOGY | Age: 83
DRG: 690 | End: 2018-07-08
Attending: EMERGENCY MEDICINE
Payer: MEDICARE

## 2018-07-08 ENCOUNTER — HOSPITAL ENCOUNTER (INPATIENT)
Age: 83
LOS: 3 days | Discharge: NURSING, REHAB OR LONG-TERM CARE | DRG: 690 | End: 2018-07-12
Attending: EMERGENCY MEDICINE | Admitting: INTERNAL MEDICINE
Payer: MEDICARE

## 2018-07-08 DIAGNOSIS — N30.00 ACUTE CYSTITIS WITHOUT HEMATURIA: Primary | ICD-10-CM

## 2018-07-08 PROBLEM — R26.2 UNABLE TO WALK: Status: ACTIVE | Noted: 2018-07-08

## 2018-07-08 PROBLEM — L03.116 CELLULITIS OF LEFT FOOT: Status: ACTIVE | Noted: 2018-07-08

## 2018-07-08 PROBLEM — I10 ESSENTIAL HYPERTENSION: Status: ACTIVE | Noted: 2018-07-08

## 2018-07-08 PROBLEM — N39.0 UTI (URINARY TRACT INFECTION): Status: ACTIVE | Noted: 2018-07-08

## 2018-07-08 PROBLEM — R07.89 CHEST PAIN, MUSCULOSKELETAL: Status: ACTIVE | Noted: 2018-07-08

## 2018-07-08 LAB
ANION GAP SERPL CALC-SCNC: 8 MMOL/L (ref 7–16)
BACTERIA URNS QL MICRO: NORMAL /HPF
BASOPHILS # BLD: 0 K/UL (ref 0–0.2)
BASOPHILS NFR BLD: 0 % (ref 0–2)
BUN SERPL-MCNC: 13 MG/DL (ref 8–23)
CALCIUM SERPL-MCNC: 9.3 MG/DL (ref 8.3–10.4)
CASTS URNS QL MICRO: 0 /LPF
CHLORIDE SERPL-SCNC: 105 MMOL/L (ref 98–107)
CK SERPL-CCNC: 125 U/L (ref 21–215)
CO2 SERPL-SCNC: 29 MMOL/L (ref 21–32)
CREAT SERPL-MCNC: 0.54 MG/DL (ref 0.6–1)
CRYSTALS URNS QL MICRO: 0 /LPF
DIFFERENTIAL METHOD BLD: ABNORMAL
EOSINOPHIL # BLD: 0.1 K/UL (ref 0–0.8)
EOSINOPHIL NFR BLD: 1 % (ref 0.5–7.8)
EPI CELLS #/AREA URNS HPF: NORMAL /HPF
ERYTHROCYTE [DISTWIDTH] IN BLOOD BY AUTOMATED COUNT: 14.1 % (ref 11.9–14.6)
GLUCOSE SERPL-MCNC: 103 MG/DL (ref 65–100)
HCT VFR BLD AUTO: 39.6 % (ref 35.8–46.3)
HGB BLD-MCNC: 12.7 G/DL (ref 11.7–15.4)
IMM GRANULOCYTES # BLD: 0 K/UL (ref 0–0.5)
IMM GRANULOCYTES NFR BLD AUTO: 0 % (ref 0–5)
LYMPHOCYTES # BLD: 1.6 K/UL (ref 0.5–4.6)
LYMPHOCYTES NFR BLD: 15 % (ref 13–44)
MCH RBC QN AUTO: 27.1 PG (ref 26.1–32.9)
MCHC RBC AUTO-ENTMCNC: 32.1 G/DL (ref 31.4–35)
MCV RBC AUTO: 84.4 FL (ref 79.6–97.8)
MONOCYTES # BLD: 0.7 K/UL (ref 0.1–1.3)
MONOCYTES NFR BLD: 6 % (ref 4–12)
MUCOUS THREADS URNS QL MICRO: 0 /LPF
NEUTS SEG # BLD: 8.3 K/UL (ref 1.7–8.2)
NEUTS SEG NFR BLD: 78 % (ref 43–78)
PLATELET # BLD AUTO: 287 K/UL (ref 150–450)
PMV BLD AUTO: 9.8 FL (ref 10.8–14.1)
POTASSIUM SERPL-SCNC: 3.7 MMOL/L (ref 3.5–5.1)
RBC # BLD AUTO: 4.69 M/UL (ref 4.05–5.25)
RBC #/AREA URNS HPF: NORMAL /HPF
SODIUM SERPL-SCNC: 142 MMOL/L (ref 136–145)
WBC # BLD AUTO: 10.6 K/UL (ref 4.3–11.1)
WBC URNS QL MICRO: NORMAL /HPF

## 2018-07-08 PROCEDURE — 87086 URINE CULTURE/COLONY COUNT: CPT | Performed by: EMERGENCY MEDICINE

## 2018-07-08 PROCEDURE — 74011000302 HC RX REV CODE- 302: Performed by: INTERNAL MEDICINE

## 2018-07-08 PROCEDURE — 99284 EMERGENCY DEPT VISIT MOD MDM: CPT | Performed by: EMERGENCY MEDICINE

## 2018-07-08 PROCEDURE — 81015 MICROSCOPIC EXAM OF URINE: CPT | Performed by: EMERGENCY MEDICINE

## 2018-07-08 PROCEDURE — 74011250637 HC RX REV CODE- 250/637: Performed by: INTERNAL MEDICINE

## 2018-07-08 PROCEDURE — 73630 X-RAY EXAM OF FOOT: CPT

## 2018-07-08 PROCEDURE — 74011250636 HC RX REV CODE- 250/636: Performed by: EMERGENCY MEDICINE

## 2018-07-08 PROCEDURE — 96365 THER/PROPH/DIAG IV INF INIT: CPT | Performed by: EMERGENCY MEDICINE

## 2018-07-08 PROCEDURE — 85025 COMPLETE CBC W/AUTO DIFF WBC: CPT | Performed by: EMERGENCY MEDICINE

## 2018-07-08 PROCEDURE — 82550 ASSAY OF CK (CPK): CPT | Performed by: EMERGENCY MEDICINE

## 2018-07-08 PROCEDURE — 74011250636 HC RX REV CODE- 250/636: Performed by: INTERNAL MEDICINE

## 2018-07-08 PROCEDURE — 74011000258 HC RX REV CODE- 258: Performed by: EMERGENCY MEDICINE

## 2018-07-08 PROCEDURE — 86580 TB INTRADERMAL TEST: CPT | Performed by: INTERNAL MEDICINE

## 2018-07-08 PROCEDURE — 73502 X-RAY EXAM HIP UNI 2-3 VIEWS: CPT

## 2018-07-08 PROCEDURE — 71045 X-RAY EXAM CHEST 1 VIEW: CPT

## 2018-07-08 PROCEDURE — 99218 HC RM OBSERVATION: CPT

## 2018-07-08 PROCEDURE — 80048 BASIC METABOLIC PNL TOTAL CA: CPT | Performed by: EMERGENCY MEDICINE

## 2018-07-08 RX ORDER — SODIUM CHLORIDE 0.9 % (FLUSH) 0.9 %
5-10 SYRINGE (ML) INJECTION AS NEEDED
Status: DISCONTINUED | OUTPATIENT
Start: 2018-07-08 | End: 2018-07-12 | Stop reason: HOSPADM

## 2018-07-08 RX ORDER — ACETAMINOPHEN 325 MG/1
650 TABLET ORAL EVERY 6 HOURS
Status: DISCONTINUED | OUTPATIENT
Start: 2018-07-08 | End: 2018-07-12 | Stop reason: HOSPADM

## 2018-07-08 RX ORDER — OXYCODONE HYDROCHLORIDE 5 MG/1
5 TABLET ORAL
Status: DISCONTINUED | OUTPATIENT
Start: 2018-07-08 | End: 2018-07-12 | Stop reason: HOSPADM

## 2018-07-08 RX ORDER — NALOXONE HYDROCHLORIDE 0.4 MG/ML
0.4 INJECTION, SOLUTION INTRAMUSCULAR; INTRAVENOUS; SUBCUTANEOUS AS NEEDED
Status: DISCONTINUED | OUTPATIENT
Start: 2018-07-08 | End: 2018-07-12 | Stop reason: HOSPADM

## 2018-07-08 RX ORDER — MORPHINE SULFATE 2 MG/ML
2 INJECTION, SOLUTION INTRAMUSCULAR; INTRAVENOUS
Status: DISCONTINUED | OUTPATIENT
Start: 2018-07-08 | End: 2018-07-12 | Stop reason: HOSPADM

## 2018-07-08 RX ORDER — ACETAMINOPHEN 325 MG/1
650 TABLET ORAL
COMMUNITY

## 2018-07-08 RX ORDER — AMLODIPINE BESYLATE 5 MG/1
5 TABLET ORAL DAILY
Status: DISCONTINUED | OUTPATIENT
Start: 2018-07-08 | End: 2018-07-12 | Stop reason: HOSPADM

## 2018-07-08 RX ORDER — HEPARIN SODIUM 5000 [USP'U]/ML
5000 INJECTION, SOLUTION INTRAVENOUS; SUBCUTANEOUS EVERY 12 HOURS
Status: DISCONTINUED | OUTPATIENT
Start: 2018-07-08 | End: 2018-07-12 | Stop reason: HOSPADM

## 2018-07-08 RX ORDER — SODIUM CHLORIDE 0.9 % (FLUSH) 0.9 %
5-10 SYRINGE (ML) INJECTION EVERY 8 HOURS
Status: DISCONTINUED | OUTPATIENT
Start: 2018-07-08 | End: 2018-07-12 | Stop reason: HOSPADM

## 2018-07-08 RX ADMIN — AMLODIPINE BESYLATE 5 MG: 5 TABLET ORAL at 15:52

## 2018-07-08 RX ADMIN — CEFTRIAXONE 1 G: 1 INJECTION, POWDER, FOR SOLUTION INTRAMUSCULAR; INTRAVENOUS at 15:24

## 2018-07-08 RX ADMIN — TUBERCULIN PURIFIED PROTEIN DERIVATIVE 5 UNITS: 5 INJECTION, SOLUTION INTRADERMAL at 17:25

## 2018-07-08 RX ADMIN — HEPARIN SODIUM 5000 UNITS: 5000 INJECTION, SOLUTION INTRAVENOUS; SUBCUTANEOUS at 17:23

## 2018-07-08 RX ADMIN — ACETAMINOPHEN 650 MG: 325 TABLET ORAL at 17:19

## 2018-07-08 NOTE — IP AVS SNAPSHOT
[place] [address] 
[phone] Patient: Suzette Correa MRN: [id number] IYD:7[birth year]28/[birth year] About your hospitalization You were admitted on:  July 8, 2018 You last received care in the:  [facility] You were discharged on:  July 12, 2018 Why you were hospitalized Your primary diagnosis was:  Unable To Walk Your diagnoses also included:  Uti (Urinary Tract Infection), Chest Pain, Musculoskeletal, Essential Hypertension, Cellulitis Of Left Foot, Inability To Walk Follow-up Information Follow up With Details Comments Contact Info None   None (395) Patient stated that they have no PCP Discharge Orders None A check ca indicates which time of day the medication should be taken. My Medications START taking these medications Instructions Each Dose to Equal  
 Morning Noon Evening Bedtime  
 cephALEXin 250 mg capsule Commonly known as:  Merdis Perone Your last dose was: Your next dose is: Take 1 Cap by mouth every six (6) hours for 3 days. 250 mg  
    
   
   
   
  
 docusate sodium 100 mg capsule Commonly known as:  Anglican Lecher Your last dose was: Your next dose is: Take 1 Cap by mouth two (2) times a day for 90 days. 100 mg  
    
   
   
   
  
 lidocaine 4 % patch Commonly known as:  SALONPAS/ASPERCREME Your last dose was: Your next dose is:    
   
   
 Apply to area of rib pain daily CONTINUE taking these medications Instructions Each Dose to Equal  
 Morning Noon Evening Bedtime  
 acetaminophen 325 mg tablet Commonly known as:  TYLENOL Your last dose was: Your next dose is: Take 650 mg by mouth every six (6) hours as needed for Pain. 650 mg  
    
   
   
   
  
 amLODIPine 5 mg tablet Commonly known as:  Radha Fraction Your last dose was: Your next dose is: Take 1 Tab by mouth daily. 5 mg Where to Get Your Medications Information on where to get these meds will be given to you by the nurse or doctor. ! Ask your nurse or doctor about these medications  
  cephALEXin 250 mg capsule  
 docusate sodium 100 mg capsule  
 lidocaine 4 % patch Discharge Instructions None [facility] Announcement We are excited to announce that we are making your provider's discharge notes available to you in [employer]. You will see these notes when they are completed and signed by the physician that discharged you from your recent hospital stay. If you have any questions or concerns about any information you see in [employer], please call the Health Information Department where you were seen or reach out to your Primary Care Provider for more information about your plan of care. Introducing [facility]! [address] introduces [employer] patient portal. Now you can access parts of your medical record, email your doctor's office, and request medication refills online. 1. In your internet browser, go to https://"[employer]. [employer]/"[employer] 2. Click on the First Time User? Click Here link in the Sign In box. You will see the New Member Sign Up page. 3. Enter your [employer] Access Code exactly as it appears below. You will not need to use this code after youve completed the sign-up process. If you do not sign up before the expiration date, you must request a new code. · [employer] Access Code: [email or web address] Expires: 9/5/2018 10:43 PM 
 
4. Enter the last four digits of your Social Security Number (xxxx) and Date of Birth (mm/dd/yyyy) as indicated and click Submit. You will be taken to the next sign-up page. 5. Create a [employer] ID. This will be your [employer] login ID and cannot be changed, so think of one that is secure and easy to remember. 6. Create a [employer] password. You can change your password at any time. 7. Enter your Password Reset Question and Answer. This can be used at a later time if you forget your password. 8. Enter your e-mail address. You will receive e-mail notification when new information is available in [address] 9. Click Sign Up. You can now view and download portions of your medical record. 10. Click the Download Summary menu link to download a portable copy of your medical information. If you have questions, please visit the Frequently Asked Questions section of the [employer] website. Remember, [employer] is NOT to be used for urgent needs. For medical emergencies, dial 911. Now available from your iPhone and Android! Introducing Andriy Burgos As a [facility] patient, I wanted to make you aware of our electronic visit tool called Andriy Razofin. [facility] 24/7 allows you to connect within minutes with a medical provider 24 hours a day, seven days a week via a mobile device or tablet or logging into a secure website from your computer. You can access Andriy Burgos from anywhere in the [place]. A virtual visit might be right for you when you have a simple condition and feel like you just dont want to get out of bed, or cant get away from work for an appointment, when your regular [facility] provider is not available (evenings, weekends or holidays), or when youre out of town and need minor care. Electronic visits cost only $49 and if the [facility] 24/7 provider determines a prescription is needed to treat your condition, one can be electronically transmitted to a nearby pharmacy*. Please take a moment to enroll today if you have not already done so. The enrollment process is free and takes just a few minutes. To enroll, please download the [facility] 24/7 kori to your tablet or phone, or visit [email or web address] to enroll on your computer. And, as an [address] [place] [address] patient with a [employer] account, the results of your visits will be scanned into your electronic medical record and your primary care provider will be able to view the scanned results. We urge you to continue to see your regular [facility] provider for your ongoing medical care. And while your primary care provider may not be the one available when you seek a Andriy Burgos virtual visit, the peace of mind you get from getting a real diagnosis real time can be priceless. For more information on Andriy Razofin, view our Frequently Asked Questions (FAQs) at [email or web address]. Sincerely, 
 
Bhavya Olsen MD 
Chief Medical Officer [facility] *:  certain medications cannot be prescribed via Andriy Candelariayancifin Providers Seen During Your Hospitalization Provider Specialty Primary office phone Janeen Leslie MD Emergency Medicine [phone] Tracey Gottlieb Levo, [address] [facility] Internal Medicine [phone] Immunizations Administered for This Admission Name Date  
 TB Skin Test (PPD) Intradermal  Deferred (),  Deferred (), 7/8/2018 Your Primary Care Physician (PCP) Primary Care Physician Office Phone Office Fax NONE ** None ** ** None ** You are allergic to the following No active allergies Recent Documentation Height Weight BMI Smoking Status 1.626 m 59 kg 22.31 kg/m2 Never Smoker Emergency Contacts Name Discharge Info Relation Home Work Mobile Renetta Smith  Patient [10] [phone] Patient Belongings The following personal items are in your possession at time of discharge: 
  Dental Appliances: None  Visual Aid: Glasses      Home Medications: Sent home   Jewelry: Watch, With patient  Clothing: At bedside    Other Valuables: None Please provide this summary of care documentation to your next provider. Signatures-by signing, you are acknowledging that this After Visit Summary has been reviewed with you and you have received a copy. Patient Signature:  ____________________________________________________________ Date:  ____________________________________________________________  
  
Chalino Modesta Provider Signature:  ____________________________________________________________ Date:  ____________________________________________________________

## 2018-07-08 NOTE — ROUTINE PROCESS
TRANSFER - OUT REPORT:    Verbal report given to [facility] on Indu Butler  being transferred to [facility] for routine progression of care       Report consisted of patients Situation, Background, Assessment and   Recommendations(SBAR). Information from the following report(s) SBAR was reviewed with the receiving nurse. Lines:   Peripheral IV 07/08/18 Right Antecubital (Active)   Site Assessment Clean, dry, & intact 7/8/2018 12:46 PM   Phlebitis Assessment 0 7/8/2018 12:46 PM   Infiltration Assessment 0 7/8/2018 12:46 PM   Dressing Status Clean, dry, & intact 7/8/2018 12:46 PM   Alcohol Cap Used No 7/8/2018 12:46 PM        Opportunity for questions and clarification was provided.       Patient transported with:   [employer]

## 2018-07-08 NOTE — IP AVS SNAPSHOT
Summary of Care Report The Summary of Care report has been created to help improve care coordination. Users with access to [employer] or [address] (Web-based application) may access additional patient information including the Discharge Summary. If you are not currently a [address] user and need more information, please call the number listed below in the [place] [address] section and ask to be connected with Medical Records. Facility Information Name Address Phone [address] [place] [address] Road [address] [place] [id number] [phone] Patient Information Patient Name Sex KAREL Charlton ([id number]) Female 1926 Discharge Information Admitting Provider Service Area Unit Schuyler Duque, [address] [facility] / [place] [address] Orthopedics / [phone] Discharge Provider Discharge Date/Time Discharge Disposition Destination Schuyler Duque MD / [phone] 2018 Morning (Pending) SNF (none) Patient Language Language ENGLISH [13] Hospital Problems as of 2018  Never Reviewed Class Noted - Resolved Last Modified POA Active Problems * (Principal)Unable to walk  2018 - Present 2018 by Bruna Sis. Miriam Duque MD Yes Entered by Schuyler Duque MD  
  UTI (urinary tract infection)  2018 - Present 2018 by Bruna Moreno. Miriam Duque MD Yes Entered by Schuyler Duque MD  
  Chest pain, musculoskeletal  2018 - Present 2018 by Bruna Moreno. Miriam Duque MD Yes Entered by Schuyler Duque MD  
  Essential hypertension  2018 - Present 2018 by Bruna Sis. Miriam Duque MD Yes Entered by Schuyler Duque MD  
  Cellulitis of left foot  2018 - Present 2018 by Bruna Sis. Miriam Duque MD Yes Entered by Schuyler Duque MD  
  Inability to walk  2018 - Present 2018 by Bruna Sis. Miriam Duque MD Unknown Entered by Ashley Dickey MD  
  
You are allergic to the following No active allergies Current Discharge Medication List  
  
START taking these medications Dose & Instructions Dispensing Information Comments  
 cephALEXin 250 mg capsule Commonly known as:  Chapis Matthew Dose:  250 mg Take 1 Cap by mouth every six (6) hours for 3 days. Quantity:  12 Cap Refills:  0  
   
 docusate sodium 100 mg capsule Commonly known as:  Michael Pata Dose:  100 mg Take 1 Cap by mouth two (2) times a day for 90 days. Quantity:  60 Cap Refills:  0  
   
 lidocaine 4 % patch Commonly known as:  SALONPAS/ASPERCREME Apply to area of rib pain daily Quantity:  1 Patch Refills:  0 CONTINUE these medications which have NOT CHANGED Dose & Instructions Dispensing Information Comments  
 acetaminophen 325 mg tablet Commonly known as:  TYLENOL Dose:  650 mg Take 650 mg by mouth every six (6) hours as needed for Pain. Refills:  0  
   
 amLODIPine 5 mg tablet Commonly known as:  Deshaun Presser Dose:  5 mg Take 1 Tab by mouth daily. Quantity:  30 Tab Refills:  0 Current Immunizations Name Date  
 TB Skin Test (PPD) Intradermal  Deferred (),  Deferred (), 7/8/2018 Follow-up Information Follow up With Details Comments Contact Info None   None (395) Patient stated that they have no PCP Discharge Instructions None Chart Review Routing History No Routing History on File

## 2018-07-08 NOTE — ED TRIAGE NOTES
CNA overseeing her brother contacted EMS to inform them that pt had been lying in bed since Wednesday with no one taking care of her. Pt came into ER on the 4th after falling, was prescribed amlodipine, but it was never filled. Pt arrived covered in urine, pain to right ribs and back. Also having \"trouble with neck. \"

## 2018-07-08 NOTE — ED PROVIDER NOTES
HPI Comments: 77-year-old lady presents from home with concerns about not being able to get out of bed and move around after a fall she had several days ago. Patient's home social situation appears to be a little complex. She lives with her 2 elderly siblings both of whom get home health assistance. Apparently the patient herself does not get any home health assistance but it is unclear if that is because she has declined it or if the home health agency has not been retained to take care of her. Patient and family say that she has basically been laying in bed and has not had much to eat or drink over the last few days since her fall. She says she is submitted to much pain to be only get up or move around and now she feels too weak. She denies any specific fevers or chills. Patient says her pain is in her side and in her left hip. She also has a contusion with some apparent cellulitis on top of her left foot. Elements of this note were created using speech recognition software. As such, errors of speech recognition may be present. Patient is a 80 y.o. female presenting with fatigue. The history is provided by the patient and the EMS personnel. Fatigue   Pertinent negatives include no agitation. Pertinent negatives include no shortness of breath, no chest pain, no vomiting, no confusion, no headaches and no nausea. Past Medical History:   Diagnosis Date    Gastrointestinal disorder     GERD    Ill-defined condition     cellulitis of legs       History reviewed. No pertinent surgical history. History reviewed. No pertinent family history. Social History     Social History    Marital status: UNKNOWN     Spouse name: N/A    Number of children: N/A    Years of education: N/A     Occupational History    Not on file.      Social History Main Topics    Smoking status: Never Smoker    Smokeless tobacco: Never Used    Alcohol use No    Drug use: No    Sexual activity: Not on file Other Topics Concern    Not on file     Social History Narrative         ALLERGIES: Review of patient's allergies indicates no known allergies. Review of Systems   Constitutional: Positive for activity change, appetite change and fatigue. Negative for chills. HENT: Negative for congestion and rhinorrhea. Respiratory: Negative for cough, shortness of breath and wheezing. Cardiovascular: Negative for chest pain, palpitations and leg swelling. Gastrointestinal: Negative for diarrhea, nausea and vomiting. Musculoskeletal: Positive for arthralgias, gait problem and joint swelling. Skin: Positive for color change and wound. Neurological: Negative for dizziness and headaches. Psychiatric/Behavioral: Negative for agitation and confusion. Vitals:    07/08/18 1205   BP: 185/85   Pulse: 81   Resp: 20   Temp: 98.3 °F (36.8 °C)   SpO2: 96%   Weight: 59 kg (130 lb)   Height: 5' 4\" (1.626 m)            Physical Exam   Constitutional: She is oriented to person, place, and time. Disheveled with very poor overall personal hygiene   HENT:   Head: Normocephalic and atraumatic. Eyes: Right eye exhibits no discharge. Left eye exhibits no discharge. Neck: No JVD present. No thyromegaly present. Cardiovascular: Normal rate and normal heart sounds. Pulmonary/Chest: Effort normal and breath sounds normal.   Abdominal: There is no tenderness. There is no rebound. Musculoskeletal:   ppatient has pain with motion of her left hip and left leg    She has mild tenderness along her right ribs with no crepitus   Neurological: She is alert and oriented to person, place, and time. Skin:   Patient is very dry crusty skin on both lower extremities with evidence for venous stasis. She also has a erythematous and edematous dorsal surface of her left foot with what appears to be a developing cellulitic ulcer on top of the foot. Nursing note and vitals reviewed.        MDM  Number of Diagnoses or Management Options  Acute cystitis without hematuria:   Diagnosis management comments: We'll re-x-ray her hip and chest to look for any evidence of pleural effusion or infiltrate as well as any evidence of a injury. I will also x-ray her foot. I will check a CPK in addition to her kidney function and basic electrolytes. 3:05 PM  Urine shows significant evidence for UTI. Her blood work has been essentially unremarkable. Her x-rays were negative. I discussed the case with the hospitalist who kindly agreed to see the patient.         ED Course       Procedures

## 2018-07-08 NOTE — PROGRESS NOTES
Patient A&Ox3. Erythema on dorsal side of L foot marked. Thick, dried skin on BLE. Patient weak in all extremities. Protrusion and bruise noted on R side of ribs. Patient states it hurts with she takes a deep breath. This happened after her fall at home. Patient rates generalized pain 8/10, declines to take morphine. Did take scheduled tylenol. Requesting food, good appetite. PPD placed on R forearm. Patient placed on [facility] list for meals. Oriented to room and call light. Patient verbalizes understanding.

## 2018-07-08 NOTE — H&P
HOSPITALIST H&P 
NAME:  Scooby Starkey Age:  80 y.o. 
:   1926 MRN:   [id number] PCP: None Consulting MD: Treatment Team: Attending Provider: Lizzie Leo. Kim Ayoub MD 
HPI:  
Scooby Starkey is a 81 yo F currently on no medications except acetaminophen, who presents to the ED with R side pain and L hip pain after tripping and falling on 18 while walking to the bathroom. EMS was apparently summoned by a home health [address] who assists her elderly brother and sister-in-law. Ms. Dominique Richardson had apparently been in bed for 4-5 days due to pain with ambulation. She was found covered in urine and soiled. She reports she has been in bed most of the time, but walks a little bit. She has R side rib/chest pain that hurts with deep inspiration and some L hip pain. She denies other complaints. CXR shows no rib fx and x-ray of L hip/pelvis shows no hip fracture. In the ED, she has urinalysis consistent with UTI. Also, her bp elevated. Hospitalist service asked to admit for UTI, inability for patient to care for herself, and inability to walk. Complete ROS done and is as stated in HPI or otherwise negative Past Medical History:  
Diagnosis Date  Essential hypertension 2018  Gastrointestinal disorder GERD  Ill-defined condition   
 cellulitis of legs  UTI (urinary tract infection) 2018 Past Surgical History:  
Procedure Laterality Date  HX HIP REPLACEMENT Bilateral   
  
Prior to Admission Medications Prescriptions Last Dose Informant Patient Reported? Taking?  
acetaminophen (TYLENOL) 325 mg tablet   Yes Yes Sig: Take 650 mg by mouth every six (6) hours as needed for Pain. amLODIPine (NORVASC) 5 mg tablet   No No  
Sig: Take 1 Tab by mouth daily. Facility-Administered Medications: None No Known Allergies Social History Substance Use Topics  Smoking status: Never Smoker  Smokeless tobacco: Never Used  Alcohol use No  
  
Family History Problem Relation Age of Onset  Colon Cancer Father  Other Brother Aortic valve replacement Objective:  
 
Visit Vitals  /73  Pulse 91  Temp 97.9 °F (36.6 °C)  Resp 18  Ht 5' 4\" (1.626 m)  Wt 59 kg (130 lb)  SpO2 97%  BMI 22.31 kg/m2 Temp (24hrs), Av.1 °F (36.7 °C), Min:97.9 °F (36.6 °C), Max:98.3 °F (36.8 °C) Patient Vitals for the past 24 hrs: 
 Temp Pulse Resp BP SpO2  
18 1645 97.9 °F (36.6 °C) 91 18 151/73 97 % 18 1600 - - - 154/68 -  
18 1553 - - - 154/69 96 % 18 1552 - 84 - 154/69 -  
18 1524 - - - 146/67 -  
18 1426 - - - 194/89 97 % 18 1205 98.3 °F (36.8 °C) 81 20 185/85 95 % Oxygen Therapy O2 Sat (%): 97 % (18 1645) Pulse via Oximetry: 85 beats per minute (18 1553) O2 Device: Room air (18 1205) Physical Exam: 
General:    Alert, cooperative, elderly, disheveled with matted hair Head:   Normocephalic, without obvious abnormality, atraumatic. Nose:  Nares normal. No drainage or sinus tenderness. Lungs:   Clear to auscultation bilaterally. No Wheezing or Rhonchi. No rales. Heart:   Regular rate and rhythm,  no murmur, rub or gallop. Abdomen:   Soft, non-tender. Not distended. Bowel sounds normal.  
Extremities: No cyanosis. Legs with thickened/scaling skin and woody edema Skin:     Texture, turgor normal. Thickened, scaling skin of legs. Not Jaundiced. L foot with patch of erythema on dorsal aspect that is not tender to palpation. Neurologic: Alert and oriented x to self and location, states it is May 2014, no focal deficits Data Review:  
Recent Results (from the past 24 hour(s)) CBC WITH AUTOMATED DIFF Collection Time: 18 12:50 PM  
Result Value Ref Range WBC 10.6 4.3 - 11.1 K/uL  
 RBC 4.69 4.05 - 5.25 M/uL  
 HGB 12.7 11.7 - 15.4 g/dL HCT 39.6 35.8 - 46.3 %  MCV 84.4 79.6 - 97.8 FL  
 MCH 27.1 26.1 - 32.9 PG  
 MCHC 32.1 31.4 - 35.0 g/dL  
 RDW 14.1 11.9 - 14.6 % PLATELET 532 668 - 470 K/uL MPV 9.8 (L) 10.8 - 14.1 FL  
 DF AUTOMATED NEUTROPHILS 78 43 - 78 % LYMPHOCYTES 15 13 - 44 % MONOCYTES 6 4.0 - 12.0 % EOSINOPHILS 1 0.5 - 7.8 % BASOPHILS 0 0.0 - 2.0 % IMMATURE GRANULOCYTES 0 0.0 - 5.0 %  
 ABS. NEUTROPHILS 8.3 (H) 1.7 - 8.2 K/UL  
 ABS. LYMPHOCYTES 1.6 0.5 - 4.6 K/UL  
 ABS. MONOCYTES 0.7 0.1 - 1.3 K/UL  
 ABS. EOSINOPHILS 0.1 0.0 - 0.8 K/UL  
 ABS. BASOPHILS 0.0 0.0 - 0.2 K/UL  
 ABS. IMM. GRANS. 0.0 0.0 - 0.5 K/UL METABOLIC PANEL, BASIC Collection Time: 07/08/18 12:50 PM  
Result Value Ref Range Sodium 142 136 - 145 mmol/L Potassium 3.7 3.5 - 5.1 mmol/L Chloride 105 98 - 107 mmol/L  
 CO2 29 21 - 32 mmol/L Anion gap 8 7 - 16 mmol/L Glucose 103 (H) 65 - 100 mg/dL BUN 13 8 - 23 MG/DL Creatinine 0.54 (L) 0.6 - 1.0 MG/DL  
 GFR est AA >60 >60 ml/min/1.73m2 GFR est non-AA >60 >60 ml/min/1.73m2 Calcium 9.3 8.3 - 10.4 MG/DL  
CK Collection Time: 07/08/18 12:50 PM  
Result Value Ref Range  21 - 215 U/L  
URINE MICROSCOPIC Collection Time: 07/08/18  2:16 PM  
Result Value Ref Range WBC  0 /hpf  
 RBC 0-3 0 /hpf Epithelial cells 20-50 0 /hpf Bacteria TRACE 0 /hpf Casts 0 0 /lpf Crystals, urine 0 0 /LPF Mucus 0 0 /lpf Imaging Solange Hanley XR FOOT LT MIN 3 V Final Result IMPRESSION: No acute osseous abnormality of the left foot. Osteopenia. XR CHEST SNGL V Final Result IMPRESSION: No acute abnormality. XR HIP LT W OR WO PELV 2-3 VWS Final Result IMPRESSION: No acute osseous abnormality or joint derangement of the left hip. Assessment and Plan: Active Hospital Problems Diagnosis Date Noted  Unable to walk 07/08/2018  UTI (urinary tract infection) 07/08/2018  Chest pain, musculoskeletal 07/08/2018  Essential hypertension 07/08/2018  Cellulitis of left foot 07/08/2018 PLAN 
·  Will place in observation. · Start rocephin for UTI and foot cellulitis. · Have nursing outline erythema of L foot. · Schedule tylenol and give prn oxycodone/morphine for pain. · Give amlodipine for hypertension. · Consult PT/OT and case management for discharge planning. Code Status: DNR Anticipated discharge: 2 days pending clinical course Signed By: Sarkis Clancy MD   
 July 8, 2018

## 2018-07-09 ENCOUNTER — HOME HEALTH ADMISSION (OUTPATIENT)
Dept: HOME HEALTH SERVICES | Facility: HOME HEALTH | Age: 83
End: 2018-07-09

## 2018-07-09 PROBLEM — R26.2 INABILITY TO WALK: Status: ACTIVE | Noted: 2018-07-09

## 2018-07-09 PROCEDURE — G8988 SELF CARE GOAL STATUS: HCPCS

## 2018-07-09 PROCEDURE — G8987 SELF CARE CURRENT STATUS: HCPCS

## 2018-07-09 PROCEDURE — G8979 MOBILITY GOAL STATUS: HCPCS

## 2018-07-09 PROCEDURE — 74011000258 HC RX REV CODE- 258: Performed by: INTERNAL MEDICINE

## 2018-07-09 PROCEDURE — 97165 OT EVAL LOW COMPLEX 30 MIN: CPT

## 2018-07-09 PROCEDURE — 99218 HC RM OBSERVATION: CPT

## 2018-07-09 PROCEDURE — 74011250637 HC RX REV CODE- 250/637: Performed by: INTERNAL MEDICINE

## 2018-07-09 PROCEDURE — G8978 MOBILITY CURRENT STATUS: HCPCS

## 2018-07-09 PROCEDURE — 74011250636 HC RX REV CODE- 250/636: Performed by: INTERNAL MEDICINE

## 2018-07-09 PROCEDURE — 65270000029 HC RM PRIVATE

## 2018-07-09 PROCEDURE — 97161 PT EVAL LOW COMPLEX 20 MIN: CPT

## 2018-07-09 RX ADMIN — ACETAMINOPHEN 650 MG: 325 TABLET ORAL at 03:51

## 2018-07-09 RX ADMIN — ACETAMINOPHEN 650 MG: 325 TABLET ORAL at 14:07

## 2018-07-09 RX ADMIN — HEPARIN SODIUM 5000 UNITS: 5000 INJECTION, SOLUTION INTRAVENOUS; SUBCUTANEOUS at 05:39

## 2018-07-09 RX ADMIN — AMLODIPINE BESYLATE 5 MG: 5 TABLET ORAL at 07:50

## 2018-07-09 RX ADMIN — CEFTRIAXONE 1 G: 1 INJECTION, POWDER, FOR SOLUTION INTRAMUSCULAR; INTRAVENOUS at 14:07

## 2018-07-09 RX ADMIN — HEPARIN SODIUM 5000 UNITS: 5000 INJECTION, SOLUTION INTRAVENOUS; SUBCUTANEOUS at 17:27

## 2018-07-09 NOTE — PROGRESS NOTES
Problem: Self Care Deficits Care Plan (Adult)  Goal: *Acute Goals and Plan of Care (Insert Text)   GOALS:  1. Patient will perform self-feeding with minimal assistance/contact guard assist within 7 day(s). 2.  Patient will perform grooming with minimal assistance/contact guard assist within 7 day(s). 3.  Patient will perform [facility] transfer with moderate/minimal assistance within 7 day(s). 4.  Patient will perform upper body bathing with minimal assistance within 7 day(s). 5.  Patient will participate in upper extremity therapeutic exercise/activities with minimal assistance for 10 minutes within 7 day(s). ________________________________________________________________________________________________    OCCUPATIONAL THERAPY: Initial Assessment 7/9/2018  OBSERVATION: Hospital Day: 2  [place] / [place] / Product Type: Medicare /      NAME/AGE/GENDER: Wesley Blackwell is a 80 y.o. female   PRIMARY DIAGNOSIS:  Unable to walk Unable to walk Unable to walk        ICD-10: Treatment Diagnosis:    · Generalized Muscle Weakness (M62.81)  · Other lack of cordination (R27.8)  · Difficulty in walking, Not elsewhere classified (R26.2)   Precautions/Allergies:     Review of patient's allergies indicates no known allergies. ASSESSMENT:     Ms. Renetta England admitted with above diagnosis; pt presents with decreased self care, functional mobility, strength and endurance. Pt would benefit form skilled OT to increase independence. Pt will not be able to return home. Recommend STR. Pt needed lots of encouragement to participate and move. This section established at most recent assessment   PROBLEM LIST (Impairments causing functional limitations):  1. Decreased Strength  2. Decreased ADL/Functional Activities  3. Decreased Transfer Abilities  4. Decreased Ambulation Ability/Technique  5. Decreased Balance  6.  Decreased Activity Tolerance   INTERVENTIONS PLANNED: (Benefits and precautions of occupational therapy have been discussed with the patient.)  1. Activities of daily living training  2. Adaptive equipment training  3. Balance training  4. Clothing management  5. Therapeutic activity  6. Therapeutic exercise     TREATMENT PLAN: Frequency/Duration: Follow patient 3x/week to address above goals. Rehabilitation Potential For Stated Goals: Good     RECOMMENDED REHABILITATION/EQUIPMENT: (at time of discharge pending progress): Due to the probability of continued deficits (see above) this patient will likely need continued skilled occupational therapy after discharge. Equipment:    TBD              OCCUPATIONAL PROFILE AND HISTORY:   History of Present Injury/Illness (Reason for Referral):  79 yo F currently on no medications except acetaminophen, who presents to the ED with R side pain and L hip pain after tripping and falling on 7/4/18 while walking to the bathroom. EMS was apparently summoned by a home health [employer] [employer] who assists her elderly brother and sister-in-law. Ms. Mary Dumont had apparently been in bed for 4-5 days due to pain with ambulation. She was found covered in urine and soiled. She reports she has been in bed most of the time, but walks a little bit. She has R side rib/chest pain that hurts with deep inspiration and some L hip pain. She denies other complaints. CXR shows no rib fx and x-ray of L hip/pelvis shows no hip fracture. In the ED, she has urinalysis consistent with UTI. Also, her bp elevated.     Past Medical History/Comorbidities:   Ms. Mary Dumont  has a past medical history of Essential hypertension (7/8/2018); Gastrointestinal disorder; Ill-defined condition; and UTI (urinary tract infection) (7/8/2018). She also has no past medical history of Arthritis; Asthma; Autoimmune disease ([address] CAD (coronary artery disease); Cancer ([address] Chronic kidney disease; Chronic obstructive pulmonary disease ([address] Dementia; Diabetes ([address] Endocrine disease; Heart failure ([address]  Liver disease; Neurological disorder; Psychiatric disorder; PUD (peptic ulcer disease); Seizures ([facility] Sickle cell disease ([facility] Sleep disorder; Stroke [facility]); or Thromboembolus ([facility] Ms. Dorita Dakin  has a past surgical history that includes hx hip replacement (Bilateral). Social History/Living Environment:   Home Environment: Private residence  One/Two Story Residence: One story  Living Alone: No  Support Systems: Family member(s)  Patient Expects to be Discharged to[de-identified] Private residence  Current DME Used/Available at Home: None  Prior Level of Function/Work/Activity:  Lives with elderly siblings   Number of Personal Factors/Comorbidities that affect the Plan of Care: Brief history (0):  LOW COMPLEXITY   ASSESSMENT OF OCCUPATIONAL PERFORMANCE[de-identified]   Activities of Daily Living:   Basic ADLs (From Assessment) Complex ADLs (From Assessment)   Feeding: Minimum assistance  Oral Facial Hygiene/Grooming: Moderate assistance  Bathing: Total assistance  Upper Body Dressing: Total assistance  Lower Body Dressing: Total assistance  Toileting: Total assistance     Grooming/Bathing/Dressing Activities of Daily Living     Cognitive Retraining  Safety/Judgement: Fall prevention                 Functional Transfers  Toilet Transfer : Moderate assistance;Assist x2  Tub Transfer: Moderate assistance;Assist x2  Shower Transfer: Moderate assistance;Assist x2     Bed/Mat Mobility  Supine to Sit: Moderate assistance;Assist x2  Sit to Stand: Moderate assistance;Assist x2  Bed to Chair: Moderate assistance;Assist x2       Most Recent Physical Functioning:   Gross Assessment:  AROM: Generally decreased, functional (BUE)  Strength: Generally decreased, functional (BUE)  Coordination: Generally decreased, functional (BUE)  Tone: Normal  Sensation: Intact (BUE)               Posture:     Balance:  Sitting: Intact  Standing: Impaired;Pull to stand; With support  Standing - Static: Fair  Standing - Dynamic : Poor Bed Mobility:  Supine to Sit: Moderate assistance;Assist x2  Wheelchair Mobility:     Transfers:  Sit to Stand: Moderate assistance;Assist x2  Stand to Sit: Moderate assistance;Assist x2  Bed to Chair: Moderate assistance;Assist x2            Patient Vitals for the past 6 hrs:   BP SpO2 Pulse   18 0350 (!) 180/91 95 % 98   18 0743 168/77 97 % 92       Mental Status  Neurologic State: Alert  Orientation Level: Oriented to person  Cognition: Follows commands  Perception: Appears intact  Perseveration: No perseveration noted  Safety/Judgement: Fall prevention                          Physical Skills Involved:  1. Balance  2. Strength  3. Activity Tolerance Cognitive Skills Affected (resulting in the inability to perform in a timely and safe manner): 1. none Psychosocial Skills Affected:  1. Habits/Routines   Number of elements that affect the Plan of Care: 3-5:  MODERATE COMPLEXITY   CLINICAL DECISION MAKIN[address] [place] [address] AM-PAC 6 Clicks   Daily Activity Inpatient Short Form  How much help from another person does the patient currently need. .. Total A Lot A Little None   1. Putting on and taking off regular lower body clothing? [x] 1   [] 2   [] 3   [] 4   2. Bathing (including washing, rinsing, drying)? [x] 1   [] 2   [] 3   [] 4   3. Toileting, which includes using toilet, bedpan or urinal?   [x] 1   [] 2   [] 3   [] 4   4. Putting on and taking off regular upper body clothing? [] 1   [x] 2   [] 3   [] 4   5. Taking care of personal grooming such as brushing teeth? [] 1   [x] 2   [] 3   [] 4   6. Eating meals? [] 1   [] 2   [x] 3   [] 4   © , Trustees of [address] [place] [id number], under license to [employer]. All rights reserved      Score:  Initial: 10 Most Recent: X (Date: -- )    Interpretation of Tool:  Represents activities that are increasingly more difficult (i.e. Bed mobility, Transfers, Gait). Score 24 23 22-20 19-15 14-10 9-7 6     Modifier CH CI CJ CK CL CM CN      ?  Self Care:     - CURRENT STATUS: CL - 60%-79% impaired, limited or restricted    - GOAL STATUS: CK - 40%-59% impaired, limited or restricted    - D/C STATUS:  ---------------To be determined---------------  [place] / [place] / Product Type: Medicare /      Medical Necessity:     · Patient is expected to demonstrate progress in strength, balance, coordination and functional technique to increase independence with self care and functional mobility. Reason for Services/Other Comments:  · Patient continues to require skilled intervention due to decrease self care and functional mobility. Use of outcome tool(s) and clinical judgement create a POC that gives a: LOW COMPLEXITY         TREATMENT:   (In addition to Assessment/Re-Assessment sessions the following treatments were rendered)     Pre-treatment Symptoms/Complaints: Tolerated sitting up in chair  Pain: Initial:   Pain Intensity 1: 5  Pain Location 1: Back, Neck  Pain Orientation 1: Right  Pain Intervention(s) 1: Repositioned  Post Session:  5     Assessment/Reassessment only, no treatment provided today    Braces/Orthotics/Lines/Etc:   · O2 Device: Room air  Treatment/Session Assessment:    · Response to Treatment:  Tolerated well  · Interdisciplinary Collaboration:   o Physical Therapist  o Registered Nurse  · After treatment position/precautions:   o Up in chair  o Bed/Chair-wheels locked  o RN notified  o LEs reclined   · Compliance with Program/Exercises: compliant all of the time. · Recommendations/Intent for next treatment session: \"Next visit will focus on advancements to more challenging activities and reduction in assistance provided\".   Total Treatment Duration:  OT Patient Time In/Time Out  Time In: 0830  Time Out: 2907 Nasim Boateng OT

## 2018-07-09 NOTE — PROGRESS NOTES
MD order rec'd to assist with d/c plans. I met with patient, her 80 yr old brother and his [employer] caregiver. Patient sitting up in chair eating lunch. Brother at bedside ambulating around room with a cane. Patient alert and orientated and very pleasant. We discussed d/c plans and her wishes for care. Patient would like to return to her brother's home to be with her cat but worries she is now a burden on her brother. Brother wants her to return home and is willing to pay for sitter support. Patient agrees that her biggest problem is her lack of mobility-patient states its because of her back pain that prevents her from getting up to the bathroom and managing her hygiene. She would love to be more mobile if the pain was managed. We talked briefly about nursing home placement as I suspect this is the best option, but patient is only observation status and would need a three night Inpt stay for [address] placement. Patient has no other Inpt hospitalizations in the last 30 days. Patient asking for help obtaining govt assistance which I assume she means Medicaid. She states she has no formal income. I rec'd a call from Jah Vaughan with [facility] who was very concerned about Ms Dominique Richardson returning home. She does not think they have the funds for sitter support but states they have a  that manages their money. Brother states their  is Martha Salazar but did not have his contact info. I will pursue what resources are available which will be needed to insure a safe discharge home. Home health RN, PT, aide. SW could be arranged but she requires more help at present. Patient also does not have primary care but could see her brother's MD at  [facility]. Patient and brother are asking for guidance and wanting to be given some directions as to appropriate care. Will discuss with MD and try and contact their financial person.  Mary Jalloh

## 2018-07-09 NOTE — PROGRESS NOTES
Hospitalist Progress Note    2018  Admit Date: 2018 12:01 PM   NAME: Bo Ordonez   :  1926   MRN:  [id number]   Attending: Nomi Silva. Franc Joshi MD  PCP:  None    SUBJECTIVE:   Bo Ordonez is a 81 yo F placed in obs on  for inability to walk due to progressive R side pain after falling on , poor social situation/inability to care for herself, and UTI. She reports she is okay. States R side pain controlled presently. Urine cx with NGTD. Review of Systems negative with exception of pertinent positives noted above  PHYSICAL EXAM     Visit Vitals    /77    Pulse 92    Temp 98 °F (36.7 °C)    Resp 16    Ht 5' 4\" (1.626 m)    Wt 59 kg (130 lb)    SpO2 97%    BMI 22.31 kg/m2      Temp (24hrs), Av.3 °F (36.8 °C), Min:97.8 °F (36.6 °C), Max:99.1 °F (37.3 °C)    Patient Vitals for the past 24 hrs:   Temp Pulse Resp BP SpO2   18 0743 98 °F (36.7 °C) 92 16 168/77 97 %   18 0350 99.1 °F (37.3 °C) 98 18 (!) 180/91 95 %   18 0032 97.8 °F (36.6 °C) 90 16 150/72 95 %   18 2028 98.6 °F (37 °C) 91 18 158/72 95 %   18 1645 97.9 °F (36.6 °C) 91 18 151/73 97 %   18 1600 - - - 154/68 -   18 1553 - - - 154/69 96 %   18 1552 - 84 - 154/69 -   18 1524 - - - 146/67 -   18 1426 - - - 194/89 97 %   18 1205 98.3 °F (36.8 °C) 81 20 185/85 95 %       Oxygen Therapy  O2 Sat (%): 97 % (18 0743)  Pulse via Oximetry: 85 beats per minute (18 1553)  O2 Device: Room air (18 1205)  No intake or output data in the 24 hours ending 18 0932   General: No acute distress, elderly, alert and oriented to self, situation, and year  Lungs:  CTA Bilaterally.    Heart:  Regular rate and rhythm,  No murmur, rub, or gallop  Abdomen: Soft, Non distended, Non tender, Positive bowel sounds  Extremities: No cyanosis, skin of legs hypertrophic and flaking, L foot with circular area of erythema that is not warm to touch  Neurologic:  No focal deficits    Recent Results (from the past 24 hour(s))   CBC WITH AUTOMATED DIFF    Collection Time: 07/08/18 12:50 PM   Result Value Ref Range    WBC 10.6 4.3 - 11.1 K/uL    RBC 4.69 4.05 - 5.25 M/uL    HGB 12.7 11.7 - 15.4 g/dL    HCT 39.6 35.8 - 46.3 %    MCV 84.4 79.6 - 97.8 FL    MCH 27.1 26.1 - 32.9 PG    MCHC 32.1 31.4 - 35.0 g/dL    RDW 14.1 11.9 - 14.6 %    PLATELET 794 110 - 876 K/uL    MPV 9.8 (L) 10.8 - 14.1 FL    DF AUTOMATED      NEUTROPHILS 78 43 - 78 %    LYMPHOCYTES 15 13 - 44 %    MONOCYTES 6 4.0 - 12.0 %    EOSINOPHILS 1 0.5 - 7.8 %    BASOPHILS 0 0.0 - 2.0 %    IMMATURE GRANULOCYTES 0 0.0 - 5.0 %    ABS. NEUTROPHILS 8.3 (H) 1.7 - 8.2 K/UL    ABS. LYMPHOCYTES 1.6 0.5 - 4.6 K/UL    ABS. MONOCYTES 0.7 0.1 - 1.3 K/UL    ABS. EOSINOPHILS 0.1 0.0 - 0.8 K/UL    ABS. BASOPHILS 0.0 0.0 - 0.2 K/UL    ABS. IMM. GRANS. 0.0 0.0 - 0.5 K/UL   METABOLIC PANEL, BASIC    Collection Time: 07/08/18 12:50 PM   Result Value Ref Range    Sodium 142 136 - 145 mmol/L    Potassium 3.7 3.5 - 5.1 mmol/L    Chloride 105 98 - 107 mmol/L    CO2 29 21 - 32 mmol/L    Anion gap 8 7 - 16 mmol/L    Glucose 103 (H) 65 - 100 mg/dL    BUN 13 8 - 23 MG/DL    Creatinine 0.54 (L) 0.6 - 1.0 MG/DL    GFR est AA >60 >60 ml/min/1.73m2    GFR est non-AA >60 >60 ml/min/1.73m2    Calcium 9.3 8.3 - 10.4 MG/DL   CK    Collection Time: 07/08/18 12:50 PM   Result Value Ref Range     21 - 215 U/L   URINE MICROSCOPIC    Collection Time: 07/08/18  2:16 PM   Result Value Ref Range    WBC  0 /hpf    RBC 0-3 0 /hpf    Epithelial cells 20-50 0 /hpf    Bacteria TRACE 0 /hpf    Casts 0 0 /lpf    Crystals, urine 0 0 /LPF    Mucus 0 0 /lpf   CULTURE, URINE    Collection Time: 07/08/18  2:16 PM   Result Value Ref Range    Special Requests: NO SPECIAL REQUESTS      Culture result:        NO GROWTH AFTER SHORT PERIOD OF INCUBATION. FURTHER RESULTS TO FOLLOW AFTER OVERNIGHT INCUBATION.      Imaging:    XR FOOT LT MIN 3 V   Final Result   IMPRESSION: No acute osseous abnormality of the left foot. Osteopenia. XR CHEST SNGL V   Final Result   IMPRESSION: No acute abnormality. XR HIP LT W OR WO PELV 2-3 VWS   Final Result   IMPRESSION: No acute osseous abnormality or joint derangement of the left hip. ASSESSMENT      Hospital Problems as of 7/9/2018  Never Reviewed          Codes Class Noted - Resolved POA    * (Principal)Unable to walk ICD-10-CM: R26.2  ICD-9-CM: 719.7  7/8/2018 - Present Yes        UTI (urinary tract infection) ICD-10-CM: N39.0  ICD-9-CM: 599.0  7/8/2018 - Present Yes        Chest pain, musculoskeletal ICD-10-CM: R07.89  ICD-9-CM: 786.59  7/8/2018 - Present Yes        Essential hypertension ICD-10-CM: I10  ICD-9-CM: 401.9  7/8/2018 - Present Yes        Cellulitis of left foot ICD-10-CM: L03.116  ICD-9-CM: 682.7  7/8/2018 - Present Yes            Plan:  · UTI- continue rocephin. · HTN- amlodipine. · Inability to walk/generalized weakness- PT pending. · Self care neglect/failure to thrive- case management consulted. · Possible L foot cellulitis- slightly less red. Suspect this area is chronic and not acute cellulitis. DVT Prophylaxis:     Signed By: Scott Cabello.  Van Marie MD     July 9, 2018

## 2018-07-09 NOTE — PROGRESS NOTES
Medicare Outpatient Observation Notice provided to the patient. Oral explanation was provided and all questions answered. Signed document placed in the medical record under media tab. Copy to patient.       DCR

## 2018-07-09 NOTE — PHYSICIAN ADVISORY
Letter of Determination: Upgrade from Observation to Inpatient Status    This patient was originally hospitalized as Outpatient Status with Observation Services on 7/8/2018 for inability to walk. This patient now meets for Inpatient Admission in accordance with CMS regulation Section 43 .3. Specifically, patient's stay is now over Two Midnights and was medically necessary. The patient's stay was medically necessary based on extreme advanced age, vital signs significant for blood pressure of 194/89 mmHg, and treatment with intravenous ceftriaxone 1 gram daily for cellulitis of the left foot. Consistent with CMS guidelines, patient meets for inpatient status. It is our recommendation that this patient's hospitalization status should be upgraded from OBSERVATION to INPATIENT status.      The final decision regarding the patient's hospitalization status depends on the attending physician's judgement.     Helene Martinez MD, LEE,   Physician Darrick Elizabeth.

## 2018-07-09 NOTE — PROGRESS NOTES
Problem: Mobility Impaired (Adult and Pediatric)  Goal: *Acute Goals and Plan of Care (Insert Text)  STG:  (1.)Ms. Chong will move from supine to sit and sit to supine  with MODERATE ASSIST within three treatment day(s). (2.)Ms. Chong will transfer from bed to chair and chair to bed with MODERATE ASSIST using the least restrictive device within three treatment day(s). (3.)Ms. Chong will ambulate with MODERATE ASSIST for 10 feet with the least restrictive device within three treatment day(s). LTG:  (1.)Ms. Chong will move from supine to sit and sit to supine  in bed with MINIMAL ASSIST-INDEPENDENT within seven treatment day(s). (2.)Ms. Chong will transfer from bed to chair and chair to bed with MINIMAL ASSIST-INDEPENDENT using the least restrictive device within seven treatment day(s). (3.)Ms. Chong will ambulate with MINIMAL ASSIST-INDEPENDENT for 10-50 feet with the least restrictive device within seven treatment day(s). ________________________________________________________________________________________________    Outcome: Progressing Towards Goal    PHYSICAL THERAPY: Initial Assessment 7/9/2018  OBSERVATION: Hospital Day: 2  [place] / Product Type: Medicare /      NAME/AGE/GENDER: Jeanine Andrew is a 80 y.o. female   PRIMARY DIAGNOSIS: Unable to walk Unable to walk Unable to walk        ICD-10: Treatment Diagnosis:    · Difficulty in walking, Not elsewhere classified (R26.2)  · Other abnormalities of gait and mobility (R26.89)   Precaution/Allergies:  Review of patient's allergies indicates no known allergies. ASSESSMENT:     Ms. Maximo Vo presents with decreased independence with functional mobility. Therapy will maximize independence with functional mobility. Pt progressing with out of bed activity. Pt at first appears very resistant to therapy, despite gentle nudges to encourage out of bed activity.  With RN's persuasive efforts, pt reluctantly sat edge of bed, transferred, and sat in bedside chair. Pt left sitting in bedside chair. RN present and notified. Needs in reach. This section established at most recent assessment   PROBLEM LIST (Impairments causing functional limitations):  1. Decreased Strength  2. Decreased Transfer Abilities  3. Decreased Ambulation Ability/Technique  4. Decreased Balance  5. Increased Pain  6. Decreased Activity Tolerance  7. Decreased Flexibility/Joint Mobility   INTERVENTIONS PLANNED: (Benefits and precautions of physical therapy have been discussed with the patient.)  1. Bed Mobility  2. Gait Training  3. Therapeutic Activites  4. Transfer Training  5. Group Therapy     TREATMENT PLAN: Frequency/Duration: daily for duration of hospital stay  Rehabilitation Potential For Stated Goals: Good     RECOMMENDED REHABILITATION/EQUIPMENT: (at time of discharge pending progress): Due to the probability of continued deficits (see above) this patient will likely need continued skilled physical therapy after discharge. Equipment:    None at this time              HISTORY:   History of Present Injury/Illness (Reason for Referral): Cyndy Ware is a 81 yo F currently on no medications except acetaminophen, who presents to the ED with R side pain and L hip pain after tripping and falling on 7/4/18 while walking to the bathroom. EMS was apparently summoned by a home health [address] [place] [address] [employer] who assists her elderly brother and sister-in-law. Ms. Catherine Fay had apparently been in bed for 4-5 days due to pain with ambulation. She was found covered in urine and soiled. She reports she has been in bed most of the time, but walks a little bit. She has R side rib/chest pain that hurts with deep inspiration and some L hip pain. She denies other complaints. CXR shows no rib fx and x-ray of L hip/pelvis shows no hip fracture. In the ED, she has urinalysis consistent with UTI.   Also, her bp elevated.     Hospitalist service asked to admit for UTI, inability for patient to care for herself, and inability to walk. Past Medical History/Comorbidities:   Ms. Emily Kirkpatrick  has a past medical history of Essential hypertension (7/8/2018); Gastrointestinal disorder; Ill-defined condition; and UTI (urinary tract infection) (7/8/2018). She also has no past medical history of Arthritis; Asthma; Autoimmune disease ([facility] [address] CAD (coronary artery disease); Cancer ([address] Chronic kidney disease; Chronic obstructive pulmonary disease ([address] Dementia; Diabetes ([address] Endocrine disease; Heart failure ([facility] [address] Liver disease; Neurological disorder; Psychiatric disorder; PUD (peptic ulcer disease); Seizures ([facility] [address] Sickle cell disease ([facility] [address] Sleep disorder; Stroke [facility]); or Thromboembolus ([facility] [address] Ms. Emily Kirkpatrick  has a past surgical history that includes hx hip replacement (Bilateral). Social History/Living Environment:   Home Environment: Private residence  One/Two Story Residence: One story  Living Alone: No  Support Systems: Family member(s)  Patient Expects to be Discharged to[de-identified] Private residence  Current DME Used/Available at Home: None  Prior Level of Function/Work/Activity:  Pt has been walking some at home but appears mainly bedbound and lives with two elderly siblings. Number of Personal Factors/Comorbidities that affect the Plan of Care: 0: LOW COMPLEXITY   EXAMINATION:   Most Recent Physical Functioning:   Gross Assessment:  AROM: Generally decreased, functional  Strength: Generally decreased, functional  Coordination: Generally decreased, functional  Tone: Normal  Sensation: Intact               Posture:  Posture (WDL): Within defined limits  Balance:  Sitting: Intact  Standing: Pull to stand; With support  Standing - Static: Fair  Standing - Dynamic : Poor Bed Mobility:  Supine to Sit: Moderate assistance;Assist x2  Wheelchair Mobility:     Transfers:  Sit to Stand:  Moderate assistance;Assist x2  Stand to Sit: Moderate assistance;Assist x2  Bed to Chair: Moderate assistance;Assist x2  Gait: Gait Abnormalities: Antalgic  Distance (ft): 8 Feet (ft) (to bedside chair)  Ambulation - Level of Assistance: Moderate assistance;Assist x2      Body Structures Involved:  1. Bones  2. Joints  3. Muscles  4. Ligaments Body Functions Affected:  1. Neuromusculoskeletal  2. Movement Related Activities and Participation Affected:  1. Mobility   Number of elements that affect the Plan of Care: 4+: HIGH COMPLEXITY   CLINICAL PRESENTATION:   Presentation: Stable and uncomplicated: LOW COMPLEXITY   CLINICAL DECISION MAKING:   [facility] AM-PAC 6 Clicks   Basic Mobility Inpatient Short Form  How much difficulty does the patient currently have. .. Unable A Lot A Little None   1. Turning over in bed (including adjusting bedclothes, sheets and blankets)? [] 1   [] 2   [x] 3   [] 4   2. Sitting down on and standing up from a chair with arms ( e.g., wheelchair, bedside commode, etc.)   [] 1   [] 2   [x] 3   [] 4   3. Moving from lying on back to sitting on the side of the bed? [] 1   [] 2   [x] 3   [] 4   How much help from another person does the patient currently need. .. Total A Lot A Little None   4. Moving to and from a bed to a chair (including a wheelchair)? [] 1   [] 2   [x] 3   [] 4   5. Need to walk in hospital room? [] 1   [] 2   [x] 3   [] 4   6. Climbing 3-5 steps with a railing? [] 1   [] 2   [x] 3   [] 4   © 2007, Trustees of [facility], under license to [employer]. All rights reserved      Score:  Initial: 18 Most Recent: X (Date: -- )    Interpretation of Tool:  Represents activities that are increasingly more difficult (i.e. Bed mobility, Transfers, Gait). Score 24 23 22-20 19-15 14-10 9-7 6     Modifier CH CI CJ CK CL CM CN      ?  Mobility - Walking and Moving Around:     - CURRENT STATUS: CK - 40%-59% impaired, limited or restricted    - GOAL STATUS: CK - 40%-59% impaired, limited or restricted    - D/C STATUS:  CK - 40%-59% impaired, limited or restricted  [place] / [place] / Product Type: Medicare /      Medical Necessity:     · Skilled intervention continues to be required due to decreased mobility ability. Reason for Services/Other Comments:  · Patient continues to require skilled intervention due to decreased mobility ability. Use of outcome tool(s) and clinical judgement create a POC that gives a: Clear prediction of patient's progress: LOW COMPLEXITY            TREATMENT:   (In addition to Assessment/Re-Assessment sessions the following treatments were rendered)   Pre-treatment Symptoms/Complaints:  Pain in back and neck  Pain: Initial:   Pain Intensity 1: 5  Pain Location 1: Generalized  Post Session:  Pain in back and neck     Assessment/Reassessment only, no treatment provided today    Braces/Orthotics/Lines/Etc:   · O2 Device: Room air  Treatment/Session Assessment:    · Response to Treatment:  Pt reluctantly agreeable to get to bedside chair. Pt yelled loudly in pain with bed mobility. · Interdisciplinary Collaboration:   o Physical Therapist  o Occupational Therapist  o Registered Nurse  · After treatment position/precautions:   o Up in chair  o Bed/Chair-wheels locked  o Bed in low position  o Call light within reach  o RN notified   · Compliance with Program/Exercises: compliant most of the time. · Recommendations/Intent for next treatment session: \"Next visit will focus on advancements to more challenging activities and reduction in assistance provided\".   Total Treatment Duration:  PT Patient Time In/Time Out  Time In: 0840  Time Out: JEAN-CLAUDE Arvizu

## 2018-07-09 NOTE — PROGRESS NOTES
Spiritual Care initial visit made by [employer]. Card left. HUMBERTO Sams Div  / Bereavement Coordinator

## 2018-07-09 NOTE — PROGRESS NOTES
I rec'd call from [facility]. Patient is being changed to Inpt status and therefore can now meet criteria for NHP. Patient is happy with news that NHP can be covered initially under Medicare. Patient will need to apply for [place] Medicaid. Patient did not have a preference towards facility but would like to stay in the [facility]. Three night stay starts today so the earliest we could send would be Thurs 7-12. PPD placed on Sunday. Will send out NH referrals.   Autumn Gist

## 2018-07-09 NOTE — PROGRESS NOTES
Shift assessment complete. Pt. Resting quietly in bed. Alert and oriented x 3. +2 Pedal pulses with dry, flaky skin of lower extremities bilaterally. Currently wearing brief for urinary incontinence. Pt. addresses no complaints at this time. Bed low and locked. Side rails x3. Call light within reach.

## 2018-07-09 NOTE — PROGRESS NOTES
Pt resting quietly in the bed. Alert and oriented x's 4. Pt wearing brief for urinary incontinence. But asked for bed pan to have a BM. Pt had Medium formed brown stool . No verbalized c/o's at this time  Skin to bilateral LE's is dry, flaky and is white. On left foot she has a reddened area on dorsal side. Pt  Has discomfort when turning on her right side, and has an area that is swollen. VSS. No noted distress.

## 2018-07-10 PROCEDURE — 74011250637 HC RX REV CODE- 250/637: Performed by: INTERNAL MEDICINE

## 2018-07-10 PROCEDURE — 97116 GAIT TRAINING THERAPY: CPT

## 2018-07-10 PROCEDURE — 94760 N-INVAS EAR/PLS OXIMETRY 1: CPT

## 2018-07-10 PROCEDURE — 74011250636 HC RX REV CODE- 250/636: Performed by: INTERNAL MEDICINE

## 2018-07-10 PROCEDURE — 97530 THERAPEUTIC ACTIVITIES: CPT

## 2018-07-10 PROCEDURE — 65270000029 HC RM PRIVATE

## 2018-07-10 RX ORDER — LIDOCAINE 50 MG/G
1 PATCH TOPICAL EVERY 24 HOURS
Status: DISCONTINUED | OUTPATIENT
Start: 2018-07-10 | End: 2018-07-10 | Stop reason: SDUPTHER

## 2018-07-10 RX ORDER — LIDOCAINE 4 G/100G
1 PATCH TOPICAL EVERY 24 HOURS
Status: DISCONTINUED | OUTPATIENT
Start: 2018-07-10 | End: 2018-07-12 | Stop reason: HOSPADM

## 2018-07-10 RX ORDER — CEPHALEXIN 250 MG/1
250 CAPSULE ORAL EVERY 6 HOURS
Status: DISCONTINUED | OUTPATIENT
Start: 2018-07-10 | End: 2018-07-12 | Stop reason: HOSPADM

## 2018-07-10 RX ADMIN — ACETAMINOPHEN 650 MG: 325 TABLET ORAL at 23:41

## 2018-07-10 RX ADMIN — ACETAMINOPHEN 650 MG: 325 TABLET ORAL at 17:09

## 2018-07-10 RX ADMIN — HEPARIN SODIUM 5000 UNITS: 5000 INJECTION, SOLUTION INTRAVENOUS; SUBCUTANEOUS at 06:15

## 2018-07-10 RX ADMIN — ACETAMINOPHEN 650 MG: 325 TABLET ORAL at 06:15

## 2018-07-10 RX ADMIN — AMLODIPINE BESYLATE 5 MG: 5 TABLET ORAL at 08:36

## 2018-07-10 RX ADMIN — HEPARIN SODIUM 5000 UNITS: 5000 INJECTION, SOLUTION INTRAVENOUS; SUBCUTANEOUS at 17:09

## 2018-07-10 RX ADMIN — ACETAMINOPHEN 650 MG: 325 TABLET ORAL at 12:24

## 2018-07-10 RX ADMIN — ACETAMINOPHEN 650 MG: 325 TABLET ORAL at 00:39

## 2018-07-10 RX ADMIN — CEPHALEXIN 250 MG: 250 CAPSULE ORAL at 23:41

## 2018-07-10 NOTE — PROGRESS NOTES
Hospitalist Progress Note    7/10/2018  Admit Date: 2018 12:01 PM   NAME: Dean Hawkins   :  1926   MRN:  [id number]   Attending: [place]. Rocco Ahmadi MD  PCP:  None    SUBJECTIVE:   Dean Hawkins is a 81 yo F placed in obs on  for inability to walk due to progressive R side pain after falling on , poor social situation/inability to care for herself, and UTI. She reports she is okay. States R side pain controlled presently. Urine cx with NGTD. 7/10:  Continues c/o right sided pain. Weakness. Review of Systems negative with exception of pertinent positives noted above  PHYSICAL EXAM     Visit Vitals    /61 (BP 1 Location: Left arm, BP Patient Position: At rest)    Pulse 78    Temp 98.1 °F (36.7 °C)    Resp 16    Ht 5' 4\" (1.626 m)    Wt 59 kg (130 lb)    SpO2 96%    BMI 22.31 kg/m2      Temp (24hrs), Av.3 °F (36.8 °C), Min:98.1 °F (36.7 °C), Max:98.7 °F (37.1 °C)    Patient Vitals for the past 24 hrs:   Temp Pulse Resp BP SpO2   07/10/18 0716 98.1 °F (36.7 °C) 78 16 145/61 96 %   07/10/18 0327 98.1 °F (36.7 °C) 75 16 126/72 93 %   18 2312 98.7 °F (37.1 °C) 83 16 124/72 95 %   18 1858 98.3 °F (36.8 °C) 89 14 114/55 93 %   18 1540 98.2 °F (36.8 °C) 82 16 130/66 95 %       Oxygen Therapy  O2 Sat (%): 96 % (07/10/18 0716)  Pulse via Oximetry: 85 beats per minute (18 1553)  O2 Device: Room air (18 1205)  No intake or output data in the 24 hours ending 07/10/18 1418   General: No acute distress, elderly, alert and oriented to self, situation, and year  SKin :  Warm, dimin turgor  Lungs:  CTA Bilaterally. Splinting with deep insp.   Heart:  Regular rate and rhythm,  No murmur, rub, or gallop  Abdomen: Soft, Non distended, Non tender, Positive bowel sounds  Extremities: No cyanosis, skin of legs hypertrophic and flaking, L foot with circular area of erythema that is not warm to touch  Neurologic:  No focal deficits  Mskel:   TTP right lat rib cage, no crepitance    No results found for this or any previous visit (from the past 24 hour(s)). Imaging:    XR FOOT LT MIN 3 V   Final Result   IMPRESSION: No acute osseous abnormality of the left foot. Osteopenia. XR CHEST SNGL V   Final Result   IMPRESSION: No acute abnormality. XR HIP LT W OR WO PELV 2-3 VWS   Final Result   IMPRESSION: No acute osseous abnormality or joint derangement of the left hip. ASSESSMENT      Hospital Problems as of 7/10/2018  Never Reviewed          Codes Class Noted - Resolved POA    Inability to walk ICD-10-CM: R26.2  ICD-9-CM: 719.7  7/9/2018 - Present Unknown        * (Principal)Unable to walk ICD-10-CM: R26.2  ICD-9-CM: 719.7  7/8/2018 - Present Yes        UTI (urinary tract infection) ICD-10-CM: N39.0  ICD-9-CM: 599.0  7/8/2018 - Present Yes        Chest pain, musculoskeletal ICD-10-CM: R07.89  ICD-9-CM: 786.59  7/8/2018 - Present Yes        Essential hypertension ICD-10-CM: I10  ICD-9-CM: 401.9  7/8/2018 - Present Yes        Cellulitis of left foot ICD-10-CM: L03.116  ICD-9-CM: 682.7  7/8/2018 - Present Yes            Plan:  · UTI- continue rocephin Day #3. Change to po Keflex to complete a 7 day course. No cx data. · HTN- amlodipine. BP control reasonable. · Inability to walk/generalized weakness- PT pending. · Self care neglect/failure to thrive- case management consulted. Possible L foot cellulitis- slightly less red. Suspect this area is chronic and not acute cellulitis. Right Chest wall contusion- xray neg for fx, but does not acutely rule this out. Add Incentive spirom. Lidoderm patch. Pain rx and stool softeners.   PT/OT  DVT Prophylaxis:     Signed By: Luiza Melissa MD     July 10, 2018

## 2018-07-10 NOTE — PROGRESS NOTES
In accordance with Medicare guidelines, a copy of the Important Letter from Medicare was presented to the patient/family in anticipation of expected discharge within 48 hours. One copy was given to the patient, and a signed copy was placed in the patients chart.     DCR

## 2018-07-10 NOTE — PROGRESS NOTES
Shift assessment complete. Pt. Resting quietly in bed. Alert and oriented x 3. +2 Pedal pulses with dry, flaky skin of lower extremities bilaterally. Ereythema to dorsal side of L foot. Currently wearing brief for urinary incontinence. Pt. addresses no complaints at this time. Bed low and locked. Side rails x3. Call light within reach.

## 2018-07-11 LAB
BACTERIA SPEC CULT: NORMAL
SERVICE CMNT-IMP: NORMAL

## 2018-07-11 PROCEDURE — 74011250637 HC RX REV CODE- 250/637: Performed by: INTERNAL MEDICINE

## 2018-07-11 PROCEDURE — 77030027138 HC INCENT SPIROMETER -A

## 2018-07-11 PROCEDURE — 65270000029 HC RM PRIVATE

## 2018-07-11 PROCEDURE — 97535 SELF CARE MNGMENT TRAINING: CPT

## 2018-07-11 PROCEDURE — 97530 THERAPEUTIC ACTIVITIES: CPT

## 2018-07-11 PROCEDURE — 74011250636 HC RX REV CODE- 250/636: Performed by: INTERNAL MEDICINE

## 2018-07-11 RX ADMIN — HEPARIN SODIUM 5000 UNITS: 5000 INJECTION, SOLUTION INTRAVENOUS; SUBCUTANEOUS at 17:20

## 2018-07-11 RX ADMIN — ACETAMINOPHEN 650 MG: 325 TABLET ORAL at 06:23

## 2018-07-11 RX ADMIN — CEPHALEXIN 250 MG: 250 CAPSULE ORAL at 17:20

## 2018-07-11 RX ADMIN — CEPHALEXIN 250 MG: 250 CAPSULE ORAL at 06:24

## 2018-07-11 RX ADMIN — ACETAMINOPHEN 650 MG: 325 TABLET ORAL at 11:57

## 2018-07-11 RX ADMIN — CEPHALEXIN 250 MG: 250 CAPSULE ORAL at 11:57

## 2018-07-11 RX ADMIN — ACETAMINOPHEN 650 MG: 325 TABLET ORAL at 17:20

## 2018-07-11 RX ADMIN — HEPARIN SODIUM 5000 UNITS: 5000 INJECTION, SOLUTION INTRAVENOUS; SUBCUTANEOUS at 06:24

## 2018-07-11 RX ADMIN — AMLODIPINE BESYLATE 5 MG: 5 TABLET ORAL at 08:12

## 2018-07-11 NOTE — PROGRESS NOTES
Problem: Self Care Deficits Care Plan (Adult)  Goal: *Acute Goals and Plan of Care (Insert Text)   GOALS:  1. Patient will perform self-feeding with minimal assistance/contact guard assist within 7 day(s). Met 7/11/2018  2. Patient will perform grooming with minimal assistance/contact guard assist within 7 day(s). 3.  Patient will perform [facility] transfer with moderate/minimal assistance within 7 day(s). Met 7/11/2018  4. Patient will perform upper body bathing with minimal assistance within 7 day(s). Met 7/11/2018  5. Patient will participate in upper extremity therapeutic exercise/activities with minimal assistance for 10 minutes within 7 day(s). ________________________________________________________________________________________________    OCCUPATIONAL THERAPY: Daily Note, Treatment Day: 1st and AM 7/11/2018  INPATIENT: Hospital Day: 4  [place] / Product Type: Medicare /      NAME/AGE/GENDER: Pam Franks is a 80 y.o. female   PRIMARY DIAGNOSIS:  Unable to walk  Inability to walk Unable to walk Unable to walk        ICD-10: Treatment Diagnosis:    · Generalized Muscle Weakness (M62.81)  · Other lack of cordination (R27.8)  · Difficulty in walking, Not elsewhere classified (R26.2)   Precautions/Allergies:     Review of patient's allergies indicates no known allergies. ASSESSMENT:     Ms. Izabela Hale completed bath, dressing and toileting this am. See chart below for assist. Pt progressing well. Continue OT. This section established at most recent assessment   PROBLEM LIST (Impairments causing functional limitations):  1. Decreased Strength  2. Decreased ADL/Functional Activities  3. Decreased Transfer Abilities  4. Decreased Ambulation Ability/Technique  5. Decreased Balance  6. Decreased Activity Tolerance   INTERVENTIONS PLANNED: (Benefits and precautions of occupational therapy have been discussed with the patient.)  1.  Activities of daily living training  2. Adaptive equipment training  3. Balance training  4. Clothing management  5. Therapeutic activity  6. Therapeutic exercise     TREATMENT PLAN: Frequency/Duration: Follow patient 3x/week to address above goals. Rehabilitation Potential For Stated Goals: Good     RECOMMENDED REHABILITATION/EQUIPMENT: (at time of discharge pending progress): Due to the probability of continued deficits (see above) this patient will likely need continued skilled occupational therapy after discharge. Equipment:    TBD              OCCUPATIONAL PROFILE AND HISTORY:   History of Present Injury/Illness (Reason for Referral):  79 yo F currently on no medications except acetaminophen, who presents to the ED with R side pain and L hip pain after tripping and falling on 7/4/18 while walking to the bathroom. EMS was apparently summoned by a home health [id number] who assists her elderly brother and sister-in-law. Ms. Sivan Riley had apparently been in bed for 4-5 days due to pain with ambulation. She was found covered in urine and soiled. She reports she has been in bed most of the time, but walks a little bit. She has R side rib/chest pain that hurts with deep inspiration and some L hip pain. She denies other complaints. CXR shows no rib fx and x-ray of L hip/pelvis shows no hip fracture. In the ED, she has urinalysis consistent with UTI. Also, her bp elevated.     Past Medical History/Comorbidities:   Ms. Sivan Riley  has a past medical history of Essential hypertension (7/8/2018); Gastrointestinal disorder; Ill-defined condition; and UTI (urinary tract infection) (7/8/2018). She also has no past medical history of Arthritis; Asthma; Autoimmune disease ([address] CAD (coronary artery disease); Cancer ([address] Chronic kidney disease; Chronic obstructive pulmonary disease ([address] Dementia; Diabetes ([address] Endocrine disease; Heart failure ([address] Liver disease; Neurological disorder; Psychiatric disorder; PUD (peptic ulcer disease);  Seizures ([address] Sickle cell disease ([facility] Sleep disorder; Stroke [facility]); or Thromboembolus ([facility] Ms. Pamela Méndez  has a past surgical history that includes hx hip replacement (Bilateral). Social History/Living Environment:   Home Environment: Private residence  One/Two Story Residence: One story  Living Alone: No  Support Systems: Family member(s)  Patient Expects to be Discharged to[de-identified] Private residence  Current DME Used/Available at Home: None  Prior Level of Function/Work/Activity:  Lives with elderly siblings   Number of Personal Factors/Comorbidities that affect the Plan of Care: Brief history (0):  LOW COMPLEXITY   ASSESSMENT OF OCCUPATIONAL PERFORMANCE[de-identified]   Activities of Daily Living:   Basic ADLs (From Assessment) Complex ADLs (From Assessment)   Feeding: Minimum assistance  Oral Facial Hygiene/Grooming: Moderate assistance  Bathing: Total assistance  Upper Body Dressing: Total assistance  Lower Body Dressing: Total assistance  Toileting: Total assistance     Grooming/Bathing/Dressing Activities of Daily Living   Grooming  Grooming Assistance: Supervision/set up  Washing Face: Supervision/set-up Cognitive Retraining  Safety/Judgement: Fall prevention   Upper Body Bathing  Bathing Assistance: Supervision/set-up  Position Performed: Seated in chair Feeding  Feeding Assistance: Supervision/set-up   Lower Body Bathing  Bathing Assistance: Maximum assistance  Perineal  : Maximum assistance  Position Performed: Seated in chair;Standing  Cues: Verbal cues provided  Lower Body : Maximum assistance  Position Performed: Seated in chair  Cues: Verbal cues provided Toileting  Toileting Assistance: Supervision/set up  Bladder Hygiene: Maximum assistance   Upper Body Dressing Assistance  Dressing Assistance: [address]: Supervision/ set-up Functional Transfers  Toilet Transfer : Minimum assistance [facility])   Lower Body Dressing Assistance  Dressing Assistance: Total assistance(dependent)  Protective Undergarmet:  Total assistance (dependent)  Socks: Compensatory technique training ( socks)  Adaptive Equipment Used: Walker Bed/Mat Mobility  Supine to Sit: Moderate assistance  Sit to Stand: Minimum assistance  Bed to Chair: Minimum assistance       Most Recent Physical Functioning:   Gross Assessment:  AROM: Generally decreased, functional (BUE)  Strength: Generally decreased, functional (BUE)  Coordination: Generally decreased, functional (BUE)  Tone: Normal  Sensation: Intact (BUE)               Posture:  Posture (WDL): Within defined limits  Balance:  Sitting: Intact  Standing: Pull to stand; With support Bed Mobility:  Supine to Sit: Moderate assistance  Wheelchair Mobility:     Transfers:  Sit to Stand: Minimum assistance  Bed to Chair: Minimum assistance            Patient Vitals for the past 6 hrs:   BP SpO2 Pulse   18 0700 146/75 93 % 73       Mental Status  Neurologic State: Alert  Orientation Level: Oriented to person  Cognition: Follows commands  Perception: Appears intact  Perseveration: No perseveration noted  Safety/Judgement: Fall prevention                          Physical Skills Involved:  1. Balance  2. Strength  3. Activity Tolerance Cognitive Skills Affected (resulting in the inability to perform in a timely and safe manner): 1. none Psychosocial Skills Affected:  1. Habits/Routines   Number of elements that affect the Plan of Care: 3-5:  MODERATE COMPLEXITY   CLINICAL DECISION MAKIN [facility] [address] [address] AM-PAC 6 Clicks   Daily Activity Inpatient Short Form  How much help from another person does the patient currently need. .. Total A Lot A Little None   1. Putting on and taking off regular lower body clothing? [x] 1   [] 2   [] 3   [] 4   2. Bathing (including washing, rinsing, drying)? [x] 1   [] 2   [] 3   [] 4   3. Toileting, which includes using toilet, bedpan or urinal?   [x] 1   [] 2   [] 3   [] 4   4. Putting on and taking off regular upper body clothing?    [] 1   [x] 2   [] 3   [] 4 5. Taking care of personal grooming such as brushing teeth? [] 1   [x] 2   [] 3   [] 4   6. Eating meals? [] 1   [] 2   [x] 3   [] 4   © 2007, Trustees of [address] [place] [address] [address] [address], under license to [employer]. All rights reserved      Score:  Initial: 10 Most Recent: X (Date: -- )    Interpretation of Tool:  Represents activities that are increasingly more difficult (i.e. Bed mobility, Transfers, Gait). Score 24 23 22-20 19-15 14-10 9-7 6     Modifier CH CI CJ CK CL CM CN      ? Self Care:     - CURRENT STATUS: CL - 60%-79% impaired, limited or restricted    - GOAL STATUS: CK - 40%-59% impaired, limited or restricted    - D/C STATUS:  ---------------To be determined---------------  [place] / Product Type: Medicare /      Medical Necessity:     · Patient is expected to demonstrate progress in strength, balance, coordination and functional technique to increase independence with self care and functional mobility. Reason for Services/Other Comments:  · Patient continues to require skilled intervention due to decrease self care and functional mobility. Use of outcome tool(s) and clinical judgement create a POC that gives a: LOW COMPLEXITY         TREATMENT:   (In addition to Assessment/Re-Assessment sessions the following treatments were rendered)     Pre-treatment Symptoms/Complaints: Tolerated sitting up in chair  Pain: Initial:   Pain Intensity 1: 0   Post Session:  0     Self Care: (30): Procedure(s) (per grid) utilized to improve and/or restore self-care/home management as related to dressing, bathing, toileting and grooming. Required minimal verbal and   cueing to facilitate activities of daily living skills and compensatory activities.     Braces/Orthotics/Lines/Etc:   · O2 Device: Room air  Treatment/Session Assessment:    · Response to Treatment:  Tolerated well  · Interdisciplinary Collaboration:   o Registered Nurse  o Rehabilitation Attendant  · After treatment position/precautions:   o Up in chair  o Bed alarm/tab alert on  o Bed/Chair-wheels locked  o Call light within reach  o RN notified   · Compliance with Program/Exercises: compliant all of the time. · Recommendations/Intent for next treatment session: \"Next visit will focus on advancements to more challenging activities and reduction in assistance provided\".   Total Treatment Duration:  OT Patient Time In/Time Out  Time In: 0930  Time Out: [facility]

## 2018-07-11 NOTE — PROGRESS NOTES
Problem: Mobility Impaired (Adult and Pediatric)  Goal: *Acute Goals and Plan of Care (Insert Text)  STG:  (1.)Ms. Chong will move from supine to sit and sit to supine  with MODERATE ASSIST within three treatment day(s). (2.)Ms. Chong will transfer from bed to chair and chair to bed with MODERATE ASSIST using the least restrictive device within three treatment day(s). (3.)Ms. Chong will ambulate with MODERATE ASSIST for 10 feet with the least restrictive device within three treatment day(s). MET 7/11/18    LTG:  (1.)Ms. Chong will move from supine to sit and sit to supine  in bed with MINIMAL ASSIST-INDEPENDENT within seven treatment day(s). (2.)Ms. Chong will transfer from bed to chair and chair to bed with MINIMAL ASSIST-INDEPENDENT using the least restrictive device within seven treatment day(s). (3.)Ms. Chong will ambulate with MINIMAL ASSIST-INDEPENDENT for 10-50 feet with the least restrictive device within seven treatment day(s). ________________________________________________________________________________________________    Outcome: Progressing Towards Goal    PHYSICAL THERAPY: Daily Note, Treatment Day: 2nd, AM 7/11/2018  INPATIENT: Hospital Day: 4  [place] / Product Type: Medicare /      NAME/AGE/GENDER: Bo Ordonez is a 80 y.o. female   PRIMARY DIAGNOSIS: Unable to walk  Inability to walk Unable to walk Unable to walk        ICD-10: Treatment Diagnosis:    · Difficulty in walking, Not elsewhere classified (R26.2)  · Other abnormalities of gait and mobility (R26.89)   Precaution/Allergies:  Review of patient's allergies indicates no known allergies. ASSESSMENT:     Ms. Cecily Gómez is sitting on [facility] on arrival.  She is agreeable to ambulate. She stopped a few times and grabbed her right side in pain. Pt only walked 10 feet today. Pt left sitting up in chair with needs in reach and sitter present.      This section established at most recent assessment PROBLEM LIST (Impairments causing functional limitations):  1. Decreased Strength  2. Decreased Transfer Abilities  3. Decreased Ambulation Ability/Technique  4. Decreased Balance  5. Increased Pain  6. Decreased Activity Tolerance  7. Decreased Flexibility/Joint Mobility   INTERVENTIONS PLANNED: (Benefits and precautions of physical therapy have been discussed with the patient.)  1. Bed Mobility  2. Gait Training  3. Therapeutic Activites  4. Transfer Training  5. Group Therapy     TREATMENT PLAN: Frequency/Duration: daily for duration of hospital stay  Rehabilitation Potential For Stated Goals: Good     RECOMMENDED REHABILITATION/EQUIPMENT: (at time of discharge pending progress): Due to the probability of continued deficits (see above) this patient will likely need continued skilled physical therapy after discharge. Equipment:    None at this time              HISTORY:   History of Present Injury/Illness (Reason for Referral): Sara Duval is a 81 yo F currently on no medications except acetaminophen, who presents to the ED with R side pain and L hip pain after tripping and falling on 7/4/18 while walking to the bathroom. EMS was apparently summoned by a home health [address] [place] [address] Ave who assists her elderly brother and sister-in-law. Ms. Pamela Méndez had apparently been in bed for 4-5 days due to pain with ambulation. She was found covered in urine and soiled. She reports she has been in bed most of the time, but walks a little bit. She has R side rib/chest pain that hurts with deep inspiration and some L hip pain. She denies other complaints. CXR shows no rib fx and x-ray of L hip/pelvis shows no hip fracture. In the ED, she has urinalysis consistent with UTI. Also, her bp elevated.     Hospitalist service asked to admit for UTI, inability for patient to care for herself, and inability to walk.   Past Medical History/Comorbidities:   Ms. Pamela Méndez  has a past medical history of Essential hypertension (7/8/2018); Gastrointestinal disorder; Ill-defined condition; and UTI (urinary tract infection) (7/8/2018). She also has no past medical history of Arthritis; Asthma; Autoimmune disease ([facility] [address] [facility] CAD (coronary artery disease); Cancer ([facility] [address] [facility] Chronic kidney disease; Chronic obstructive pulmonary disease ([facility] [address] Dementia; Diabetes ([facility] [address] [facility] Endocrine disease; Heart failure ([facility] [address] [facility] Liver disease; Neurological disorder; Psychiatric disorder; PUD (peptic ulcer disease); Seizures ([facility] [address] [facility] Sickle cell disease ([facility] [address] [facility] Sleep disorder; Stroke [facility]); or Thromboembolus ([facility] [address] [facility] Ms. Anna Marie Rubi  has a past surgical history that includes hx hip replacement (Bilateral). Social History/Living Environment:   Home Environment: Private residence  One/Two Story Residence: One story  Living Alone: No  Support Systems: Family member(s)  Patient Expects to be Discharged to[de-identified] Private residence  Current DME Used/Available at Home: None  Prior Level of Function/Work/Activity:  Pt has been walking some at home but appears mainly bedbound and lives with two elderly siblings. Number of Personal Factors/Comorbidities that affect the Plan of Care: 0: LOW COMPLEXITY   EXAMINATION:   Most Recent Physical Functioning:   Gross Assessment:                  Posture:     Balance:    Bed Mobility:     Wheelchair Mobility:     Transfers:  Sit to Stand: Minimum assistance  Stand to Sit: Minimum assistance  Bed to Chair: Minimum assistance  Gait:     Base of Support: Narrowed  Speed/Xi: Delayed;Pace decreased (<100 feet/min)  Step Length: Left shortened;Right shortened  Gait Abnormalities: Antalgic;Decreased step clearance  Distance (ft): 10 Feet (ft)  Assistive Device: Walker, rolling  Ambulation - Level of Assistance: Minimal assistance;Assist x2  Interventions: Safety awareness training;Verbal cues  Duration: 15 Minutes      Body Structures Involved:  1. Bones  2. Joints  3. Muscles  4. Ligaments Body Functions Affected:  1. Neuromusculoskeletal  2.  Movement Related Activities and Participation Affected:  1. Mobility   Number of elements that affect the Plan of Care: 4+: HIGH COMPLEXITY   CLINICAL PRESENTATION:   Presentation: Stable and uncomplicated: LOW COMPLEXITY   CLINICAL DECISION MAKING:   [employer] AM-PAC 6 Clicks   Basic Mobility Inpatient Short Form  How much difficulty does the patient currently have. .. Unable A Lot A Little None   1. Turning over in bed (including adjusting bedclothes, sheets and blankets)? [] 1   [] 2   [x] 3   [] 4   2. Sitting down on and standing up from a chair with arms ( e.g., wheelchair, bedside commode, etc.)   [] 1   [] 2   [x] 3   [] 4   3. Moving from lying on back to sitting on the side of the bed? [] 1   [] 2   [x] 3   [] 4   How much help from another person does the patient currently need. .. Total A Lot A Little None   4. Moving to and from a bed to a chair (including a wheelchair)? [] 1   [] 2   [x] 3   [] 4   5. Need to walk in hospital room? [] 1   [] 2   [x] 3   [] 4   6. Climbing 3-5 steps with a railing? [] 1   [] 2   [x] 3   [] 4   © 2007, Trustees of [facility], under license to [employer]. All rights reserved      Score:  Initial: 18 Most Recent: X (Date: -- )    Interpretation of Tool:  Represents activities that are increasingly more difficult (i.e. Bed mobility, Transfers, Gait). Score 24 23 22-20 19-15 14-10 9-7 6     Modifier CH CI CJ CK CL CM CN      ? Mobility - Walking and Moving Around:     - CURRENT STATUS: CK - 40%-59% impaired, limited or restricted    - GOAL STATUS: CK - 40%-59% impaired, limited or restricted    - D/C STATUS:  CK - 40%-59% impaired, limited or restricted  [place] / Product Type: Medicare /      Medical Necessity:     · Skilled intervention continues to be required due to decreased mobility ability.   Reason for Services/Other Comments:  · Patient continues to require skilled intervention due to decreased mobility ability. Use of outcome tool(s) and clinical judgement create a POC that gives a: Clear prediction of patient's progress: LOW COMPLEXITY            TREATMENT:   (In addition to Assessment/Re-Assessment sessions the following treatments were rendered)   Pre-treatment Symptoms/Complaints:  Right sided pain  Pain: Initial:      Post Session:  Pain in right side     Gait Training (15 Minutes):  Gait training to improve and/or restore physical functioning as related to mobility, strength, balance and coordination. Ambulated 10 Feet (ft) with Minimal assistance;Assist x2 using a Walker, rolling and minimal Safety awareness training;Verbal cues related to their stance phase and stride length to promote proper body alignment and promote proper body posture. Instruction in performance of walker use to correct stance phase and stride length. Braces/Orthotics/Lines/Etc:   · O2 Device: Room air  Treatment/Session Assessment:    · Response to Treatment:  Pt tolerated better than yesterday but still having pain all over  · Interdisciplinary Collaboration:   o Registered Nurse  o Rehabilitation Attendant  o Certified Nursing Assistant/Patient Care Technician  · After treatment position/precautions:   o Up in chair  o Bed alarm/tab alert on  o Bed/Chair-wheels locked  o Call light within reach   · Compliance with Program/Exercises: compliant most of the time. · Recommendations/Intent for next treatment session: \"Next visit will focus on advancements to more challenging activities and reduction in assistance provided\".   Total Treatment Duration:  PT Patient Time In/Time Out  Time In: 1135  Time Out: [address], PTA

## 2018-07-11 NOTE — PROGRESS NOTES
Patient approved for admission to [address] [place] [address] on Thurs 7-12 if medically ready. I called her brother at home and told him I would call him with her  time so that he could meet her there. Patient agrees with plans.   Jose C Marcos

## 2018-07-11 NOTE — PROGRESS NOTES
Shift assessment complete Pt. Watching TV in bed. Alert and oriented x 3. Dry, flaky skin in lower extremities bilaterally. Pt. Wearing brief for urinary incontinence. No complaints of pain at this time. Bed low and locked. Side rails x3. Call light within reach.

## 2018-07-11 NOTE — PROGRESS NOTES
Spoke with Jose Carlos Tamayo with admissions at [place] [address]. This facility cannot accept her with Medicaid long term placement needed so Jose Carlos Tamayo is pursuing availability at her other facilities. We still hope to send tomorrow if all works out. [facility] rehab has a long term bed openning so referral sent to them for review.   Osiris Mendoza

## 2018-07-11 NOTE — PROGRESS NOTES
Hospitalist Progress Note    2018  Admit Date: 2018 12:01 PM   NAME: Devin Mercedes   :  1926   MRN:  [id number]   Attending: Bob Ortiz. Wilner Banegas MD  PCP:  None    SUBJECTIVE:   Devin Mercedes is a 79 yo F placed in obs on  for inability to walk due to progressive R side pain after falling on , poor social situation/inability to care for herself, and UTI. She reports she is okay. States R side pain controlled presently. Urine cx with NGTD. 7/10:  Continues c/o right sided pain. Weakness. :  Pain persists but maybe a little better. Waiting for facility placement. No new complaints. Slowly increasing activity. Review of Systems negative with exception of pertinent positives noted above  PHYSICAL EXAM     Visit Vitals    BP (!) 151/94    Pulse 98    Temp 96.8 °F (36 °C)    Resp 20    Ht 5' 4\" (1.626 m)    Wt 59 kg (130 lb)    SpO2 92%    BMI 22.31 kg/m2      Temp (24hrs), Av.7 °F (36.5 °C), Min:96.4 °F (35.8 °C), Max:98.6 °F (37 °C)    Patient Vitals for the past 24 hrs:   Temp Pulse Resp BP SpO2   18 1213 96.8 °F (36 °C) 98 20 (!) 151/94 92 %   18 0700 96.4 °F (35.8 °C) 73 22 146/75 93 %   07/10/18 2338 98.5 °F (36.9 °C) 82 16 131/69 93 %   07/10/18 2033 - - - - 94 %   07/10/18 1947 98.3 °F (36.8 °C) 86 16 137/77 93 %   07/10/18 1529 98.6 °F (37 °C) 85 16 139/71 97 %       Oxygen Therapy  O2 Sat (%): 92 % (18 1213)  Pulse via Oximetry: 85 beats per minute (18 1553)  O2 Device: Room air (07/10/18 2033)    Intake/Output Summary (Last 24 hours) at 18 1413  Last data filed at 18 1213   Gross per 24 hour   Intake              640 ml   Output                1 ml   Net              639 ml      General: No acute distress, elderly, alert and oriented to self, situation, and year  SKin :  Warm, dimin turgor  Lungs:  CTA Bilaterally. Splinting with deep insp.   Heart:  Regular rate and rhythm,  No murmur, rub, or gallop  Abdomen: Soft, Non distended, Non tender, Positive bowel sounds  Extremities: No cyanosis, skin of legs hypertrophic and flaking, L foot with circular area of erythema that is not warm to touch  Neurologic:  No focal deficits  Mskel:   TTP right lat rib cage, no crepitance. No results found for this or any previous visit (from the past 24 hour(s)). Imaging:    XR FOOT LT MIN 3 V   Final Result   IMPRESSION: No acute osseous abnormality of the left foot. Osteopenia. XR CHEST SNGL V   Final Result   IMPRESSION: No acute abnormality. XR HIP LT W OR WO PELV 2-3 VWS   Final Result   IMPRESSION: No acute osseous abnormality or joint derangement of the left hip. ASSESSMENT      Hospital Problems as of 7/11/2018  Never Reviewed          Codes Class Noted - Resolved POA    Inability to walk ICD-10-CM: R26.2  ICD-9-CM: 719.7  7/9/2018 - Present Unknown        * (Principal)Unable to walk ICD-10-CM: R26.2  ICD-9-CM: 719.7  7/8/2018 - Present Yes        UTI (urinary tract infection) ICD-10-CM: N39.0  ICD-9-CM: 599.0  7/8/2018 - Present Yes        Chest pain, musculoskeletal ICD-10-CM: R07.89  ICD-9-CM: 786.59  7/8/2018 - Present Yes        Essential hypertension ICD-10-CM: I10  ICD-9-CM: 401.9  7/8/2018 - Present Yes        Cellulitis of left foot ICD-10-CM: L03.116  ICD-9-CM: 682.7  7/8/2018 - Present Yes            Plan:  · UTI- continue rocephin Day #3. Change to po Keflex to complete a 7 day course. No cx data. · 7/11:  Day #4 abx, keflex  · HTN- amlodipine. BP control reasonable. · Inability to walk/generalized weakness- PT pending. · Self care neglect/failure to thrive- case management consulted. Possible L foot cellulitis- slightly less red. Suspect this area is chronic and not acute cellulitis. Right Chest wall contusion- xray neg for fx, but does not acutely rule this out  Continue  Incentive spirom. Lidoderm patch. Pain rx and stool softeners. PT/OT  DVT Prophylaxis:      To rehab when arranged.       Signed By: Billie Lyons MD     July 11, 2018

## 2018-07-11 NOTE — PROGRESS NOTES
Patient resting in recliner. Chair alarm in place. Using BSC. Had large BM. Incentive spirometer given. No needs at this time.

## 2018-07-12 VITALS
HEART RATE: 78 BPM | WEIGHT: 130 LBS | HEIGHT: 64 IN | BODY MASS INDEX: 22.2 KG/M2 | RESPIRATION RATE: 16 BRPM | DIASTOLIC BLOOD PRESSURE: 69 MMHG | SYSTOLIC BLOOD PRESSURE: 150 MMHG | TEMPERATURE: 98 F | OXYGEN SATURATION: 94 %

## 2018-07-12 LAB
MM INDURATION POC: NORMAL MM (ref 0–5)
PPD POC: NORMAL NEGATIVE

## 2018-07-12 PROCEDURE — 74011250637 HC RX REV CODE- 250/637: Performed by: INTERNAL MEDICINE

## 2018-07-12 PROCEDURE — 74011250636 HC RX REV CODE- 250/636: Performed by: INTERNAL MEDICINE

## 2018-07-12 RX ORDER — CEPHALEXIN 250 MG/1
250 CAPSULE ORAL EVERY 6 HOURS
Qty: 12 CAP | Refills: 0 | Status: SHIPPED | OUTPATIENT
Start: 2018-07-12 | End: 2018-07-15

## 2018-07-12 RX ORDER — DOCUSATE SODIUM 100 MG/1
100 CAPSULE, LIQUID FILLED ORAL 2 TIMES DAILY
Qty: 60 CAP | Refills: 0 | Status: SHIPPED | OUTPATIENT
Start: 2018-07-12 | End: 2018-10-10

## 2018-07-12 RX ORDER — LIDOCAINE 4 G/100G
PATCH TOPICAL
Qty: 1 PATCH | Refills: 0 | Status: SHIPPED | OUTPATIENT
Start: 2018-07-12 | End: 2018-07-21

## 2018-07-12 RX ADMIN — HEPARIN SODIUM 5000 UNITS: 5000 INJECTION, SOLUTION INTRAVENOUS; SUBCUTANEOUS at 05:32

## 2018-07-12 RX ADMIN — CEPHALEXIN 250 MG: 250 CAPSULE ORAL at 00:19

## 2018-07-12 RX ADMIN — CEPHALEXIN 250 MG: 250 CAPSULE ORAL at 05:32

## 2018-07-12 RX ADMIN — ACETAMINOPHEN 650 MG: 325 TABLET ORAL at 12:03

## 2018-07-12 RX ADMIN — CEPHALEXIN 250 MG: 250 CAPSULE ORAL at 12:03

## 2018-07-12 RX ADMIN — AMLODIPINE BESYLATE 5 MG: 5 TABLET ORAL at 08:06

## 2018-07-12 RX ADMIN — ACETAMINOPHEN 650 MG: 325 TABLET ORAL at 05:32

## 2018-07-12 RX ADMIN — ACETAMINOPHEN 650 MG: 325 TABLET ORAL at 00:19

## 2018-07-12 NOTE — PROGRESS NOTES
Shift assessment complete. Patient resting in bed eating breakfast. Alert and oriented x3. Lower extremities dry and flaky. Lotion applied. Wear pull-ups for incontinence episodes. Uses BSC. BM yesterday. Going to rehab today. No needs at this time. Call light within reach. Bed is low and locked. Instructed to call for assistance. Verbalized understanding.

## 2018-07-12 NOTE — PROGRESS NOTES
TRANSFER - OUT REPORT:    Verbal report given to Sasha(name) on Mary Beth Vargas  being transferred to [facility]) for routine progression of care       Report consisted of patients Situation, Background, Assessment and   Recommendations(SBAR). Information from the following report(s) SBAR, Kardex, Procedure Summary, Intake/Output and MAR was reviewed with the receiving nurse. Lines:       Opportunity for questions and clarification was provided.       Patient transported with:   Nouveaux Riche

## 2018-07-12 NOTE — PROGRESS NOTES
Patient is A&Ox4. Able to verbalize needs. Resting quietly with no distress noted. Neurovascular and peripheral vascular checks WNL. Voiding clear yellow urine. Ambulates with walker. Denies needs. Bed low and locked. Call light within reach. Instructed to call for assistance. Patient verbalizes understanding. Will monitor.

## 2018-07-12 NOTE — PROGRESS NOTES
Patient resting in recliner. Ate lunch. Feeds self. Tylenol for pain. No needs at this time. Will call for assistance.

## 2018-07-12 NOTE — DISCHARGE SUMMARY
Hospitalist Discharge Summary     Admit Date:  2018 12:01 PM   Name:  Wisam Mckeon   Age:  80 y.o.  :  1926   MRN:  [id number]   PCP:  None  Treatment Team: Attending Provider: Gustavo Carmona. Dianne Hicks MD; Utilization Review: Denny Cadet RN; Care Manager: Orion Rayo; Care Manager: JOAQUÍN Beard    Problem List for this Hospitalization:  Hospital Problems as of 2018  Never Reviewed          Codes Class Noted - Resolved POA    Inability to walk ICD-10-CM: R26.2  ICD-9-CM: 719.7  2018 - Present Unknown        * (Principal)Unable to walk ICD-10-CM: R26.2  ICD-9-CM: 719.7  2018 - Present Yes        UTI (urinary tract infection) ICD-10-CM: N39.0  ICD-9-CM: 599.0  2018 - Present Yes        Chest pain, musculoskeletal ICD-10-CM: R07.89  ICD-9-CM: 786.59  2018 - Present Yes        Essential hypertension ICD-10-CM: I10  ICD-9-CM: 401.9  2018 - Present Yes        Cellulitis of left foot ICD-10-CM: L03.116  ICD-9-CM: 682.7  2018 - Present Yes                Admission HPI from 2018: Wisam Mckeon is a 81 yo F currently on no medications except acetaminophen, who presents to the ED with R side pain and L hip pain after tripping and falling on 18 while walking to the bathroom. EMS was apparently summoned by a home health [address] [place], [place] [address]e who assists her elderly brother and sister-in-law. Ms. Saurav Hutchinson had apparently been in bed for 4-5 days due to pain with ambulation. She was found covered in urine and soiled. She reports she has been in bed most of the time, but walks a little bit. She has R side rib/chest pain that hurts with deep inspiration and some L hip pain. She denies other complaints. CXR shows no rib fx and x-ray of L hip/pelvis shows no hip fracture. In the ED, she has urinalysis consistent with UTI. Also, her bp elevated.       Hospital Course:    Pt had a fairly unventful hospital course.   She continued to have pain on right rib cage but was breathing well and maintained excellent O2 sats on room air. She was prescribed a Lidoderm patch and encouraged incentive spirometry. Her pain did improve and she was working with Physical therapy. Pt was dx with a UTI and was treated with 3 days rocephin initially and is now day #2 of Keflex. Plan total of 7 days abx. Urine cx was negative. Pt to continue with incentive spirom and stool softeners. Even though initial cxr did not show rib fx, this does not always exclude as often dx not made until some healing is seen of xray. Given pts ongoing improvement and excellent O2 sats, it was not felt necessary to re-image. Pt stable for discharge. Follow up instructions below. Plan was discussed with pt. All questions answered. Patient was stable at time of discharge and was instructed to call or return if there are any concerns or recurrence of symptoms. Diagnostic Imaging/Tests:   Xr Chest Sngl V    Result Date: 7/8/2018  Portable chest x-ray CLINICAL INDICATION: Chest pain FINDINGS: Single AP view the chest compared to a similar chest x-ray dated 7/4/2018 show the lungs to be expanded and clear. No pleural effusion or pneumothorax. The cardiac silhouette and mediastinum are stable and unremarkable. The bones are osteopenic. IMPRESSION: No acute abnormality. Xr Hip Lt W Or Wo Pelv 2-3 Vws    Result Date: 7/8/2018  Left hip CLINICAL INDICATION: Moderate left hip pain, no known injury FINDINGS: Three views of the left hip show a left total hip arthroplasty. No fractures or obvious hardware complication appreciated. The SI joints and pubic symphysis are intact. The patient is also status post right hip arthroplasty. IMPRESSION: No acute osseous abnormality or joint derangement of the left hip. Xr Foot Lt Min 3 V    Result Date: 7/8/2018  Left foot CLINICAL INDICATION: Left foot pain over the metatarsals, no known injury FINDINGS: Three views of the left foot show no displaced fracture.  The bones are osteopenic. The soft tissues are unremarkable. The joint spaces are maintained. IMPRESSION: No acute osseous abnormality of the left foot. Osteopenia. Echocardiogram results:  No results found for this visit on 07/08/18. All Micro Results     Procedure Component Value Units Date/Time    CULTURE, URINE [[id number]] Collected:  07/08/18 1416    Order Status:  Completed Specimen:  Urine from Clean catch Updated:  07/11/18 0649     Special Requests: NO SPECIAL REQUESTS        Culture result: NO GROWTH 2 DAYS             Labs: Results:       BMP, Mg, Phos No results for input(s): NA, K, CL, CO2, AGAP, BUN, CREA, CA, GLU, MG, PHOS in the last 72 hours. CBC No results for input(s): WBC, RBC, HGB, HCT, PLT, GRANS, LYMPH, EOS, MONOS, BASOS, IG, ANEU, ABL, MIGEL, ABM, ABB, AIG, HGBEXT, HCTEXT, PLTEXT in the last 72 hours. LFT No results for input(s): SGOT, ALT, TBIL, AP, TP, ALB, GLOB, AGRAT, GPT in the last 72 hours.    Cardiac Testing Lab Results   Component Value Date/Time     07/08/2018 12:50 PM    CK 83 07/04/2018 10:42 AM      Coagulation Tests No results found for: PTP, INR, APTT   A1c No results found for: HBA1C, HGBE8, GNJ8MPDQ   Lipid Panel No results found for: CHOL, CHOLPOCT, CHOLX, CHLST, CHOLV, 618354, HDL, LDL, LDLC, DLDLP, 486595, VLDLC, VLDL, TGLX, TRIGL, TRIGP, TGLPOCT, CHHD, CHHDX   Thyroid Panel No results found for: T4, T3U, TSH, TSHEXT     Most Recent UA Lab Results   Component Value Date/Time    Color YELLOW 07/04/2018 10:59 AM    Appearance CLEAR 07/04/2018 10:59 AM    Specific gravity 1.012 07/04/2018 10:59 AM    pH (UA) 7.0 07/04/2018 10:59 AM    Protein NEGATIVE  07/04/2018 10:59 AM    Glucose NEGATIVE  07/04/2018 10:59 AM    Ketone TRACE (A) 07/04/2018 10:59 AM    Bilirubin NEGATIVE  07/04/2018 10:59 AM    Blood SMALL (A) 07/04/2018 10:59 AM    Urobilinogen 0.2 07/04/2018 10:59 AM    Nitrites NEGATIVE  07/04/2018 10:59 AM    Leukocyte Esterase SMALL (A) 07/04/2018 10:59 AM No Known Allergies  Immunization History   Administered Date(s) Administered    TB Skin Test (PPD) Intradermal 07/08/2018       All Labs from Last 24 Hrs:  No results found for this or any previous visit (from the past 24 hour(s)). Discharge Exam:  Patient Vitals for the past 24 hrs:   Temp Pulse Resp BP SpO2   07/12/18 0700 98 °F (36.7 °C) 78 16 150/69 94 %   07/12/18 0400 98.3 °F (36.8 °C) 78 18 153/55 92 %   07/12/18 0014 98.1 °F (36.7 °C) 83 19 150/73 95 %   07/11/18 2000 98.6 °F (37 °C) 86 20 149/88 93 %   07/11/18 1515 98.6 °F (37 °C) 88 20 148/69 94 %   07/11/18 1213 96.8 °F (36 °C) 98 20 (!) 151/94 92 %     Oxygen Therapy  O2 Sat (%): 94 % (07/12/18 0700)  Pulse via Oximetry: 85 beats per minute (07/08/18 1553)  O2 Device: Room air (07/11/18 1515)    Intake/Output Summary (Last 24 hours) at 07/12/18 1054  Last data filed at 07/11/18 1213   Gross per 24 hour   Intake              120 ml   Output                0 ml   Net              120 ml       General:    Well nourished. Alert. No distress. Eyes:   Normal sclera. Extraocular movements intact. ENT:  Normocephalic, atraumatic. Moist mucous membranes  CV:   Regular rate and rhythm. No murmur, rub, or gallop. Lungs:  Crackles right base. No crepitance. TTP right inferior post lat ribs. Abdomen: Soft, nontender, nondistended. Bowel sounds normal.   Extremities: Warm and dry. No cyanosis or edema. Neurologic: CN II-XII grossly intact. Sensation intact. Skin:     No rashes or jaundice. Psych:  Normal mood and affect. Discharge Info:   Current Discharge Medication List            Disposition: SNF    Activity: Activity as tolerated  Diet: DIET REGULAR    No orders of the defined types were placed in this encounter. Follow-up Information     None          Time spent in patient discharge planning and coordination 35 minutes.     Signed:  Guerline Cobb MD

## 2019-09-21 ENCOUNTER — APPOINTMENT (OUTPATIENT)
Dept: GENERAL RADIOLOGY | Age: 84
End: 2019-09-21
Attending: EMERGENCY MEDICINE
Payer: MEDICARE

## 2019-09-21 ENCOUNTER — HOSPITAL ENCOUNTER (EMERGENCY)
Age: 84
Discharge: HOME | End: 2019-09-21
Attending: EMERGENCY MEDICINE | Admitting: EMERGENCY MEDICINE
Payer: MEDICARE

## 2019-09-21 VITALS
SYSTOLIC BLOOD PRESSURE: 176 MMHG | HEART RATE: 90 BPM | OXYGEN SATURATION: 96 % | DIASTOLIC BLOOD PRESSURE: 73 MMHG | TEMPERATURE: 98.2 F | RESPIRATION RATE: 20 BRPM

## 2019-09-21 DIAGNOSIS — S81.811A LACERATION OF RIGHT LOWER LEG, INITIAL ENCOUNTER: Primary | ICD-10-CM

## 2019-09-21 PROCEDURE — 99283 EMERGENCY DEPT VISIT LOW MDM: CPT | Performed by: EMERGENCY MEDICINE

## 2019-09-21 PROCEDURE — 74011250636 HC RX REV CODE- 250/636: Performed by: EMERGENCY MEDICINE

## 2019-09-21 PROCEDURE — 90715 TDAP VACCINE 7 YRS/> IM: CPT | Performed by: EMERGENCY MEDICINE

## 2019-09-21 PROCEDURE — 90471 IMMUNIZATION ADMIN: CPT | Performed by: EMERGENCY MEDICINE

## 2019-09-21 PROCEDURE — 75810000293 HC SIMP/SUPERF WND  RPR: Performed by: EMERGENCY MEDICINE

## 2019-09-21 PROCEDURE — 73590 X-RAY EXAM OF LOWER LEG: CPT

## 2019-09-21 PROCEDURE — 77030002916 HC SUT ETHLN J&J -A

## 2019-09-21 RX ADMIN — TETANUS TOXOID, REDUCED DIPHTHERIA TOXOID AND ACELLULAR PERTUSSIS VACCINE, ADSORBED 0.5 ML: 5; 2.5; 8; 8; 2.5 SUSPENSION INTRAMUSCULAR at 09:11

## 2019-09-21 NOTE — ED PROVIDER NOTES
60-year-old female w/ PMHx of dementia presents via EMS from [facility] with complaint of laceration to right lower extremity. Patient and EMS report that she was attempting to transfer from bed to wheelchair when she hit her leg on the wheelchair. Patient denies hitting head or loss of consciousness. Patient denies any other associated injuries. Denies numbness, tingling, weakness, pain, neck pain, fever, chills. Uncertain if tetanus up-to-date. The history is provided by the patient and the EMS personnel. No  was used. Laceration    The incident occurred less than 1 hour ago. The laceration is located on the right leg. The laceration is 3 cm in size. Foreign body present: no. The pain is at a severity of 1/10. The pain is mild. The pain has been constant since onset. Pertinent negatives include no numbness, no tingling, no weakness, no loss of motion, no coolness and no discoloration. It is unknown when the patient last had a tetanus shot.         Past Medical History:   Diagnosis Date    Essential hypertension 7/8/2018    Gastrointestinal disorder     GERD    Ill-defined condition     cellulitis of legs    UTI (urinary tract infection) 7/8/2018       Past Surgical History:   Procedure Laterality Date    HX HIP REPLACEMENT Bilateral          Family History:   Problem Relation Age of Onset    Colon Cancer Father     Other Brother         Aortic valve replacement       Social History     Socioeconomic History    Marital status:      Spouse name: Not on file    Number of children: Not on file    Years of education: Not on file    Highest education level: Not on file   Occupational History    Not on file   Social Needs    Financial resource strain: Not on file    Food insecurity:     Worry: Not on file     Inability: Not on file    Transportation needs:     Medical: Not on file     Non-medical: Not on file   Tobacco Use    Smoking status: Never Smoker    Smokeless tobacco: Never Used   Substance and Sexual Activity    Alcohol use: No    Drug use: No    Sexual activity: Not on file   Lifestyle    Physical activity:     Days per week: Not on file     Minutes per session: Not on file    Stress: Not on file   Relationships    Social connections:     Talks on phone: Not on file     Gets together: Not on file     Attends Hoahaoism service: Not on file     Active member of club or organization: Not on file     Attends meetings of clubs or organizations: Not on file     Relationship status: Not on file    Intimate partner violence:     Fear of current or ex partner: Not on file     Emotionally abused: Not on file     Physically abused: Not on file     Forced sexual activity: Not on file   Other Topics Concern    Not on file   Social History Narrative    Not on file         ALLERGIES: Patient has no known allergies. Review of Systems   Constitutional: Negative for fever. Respiratory: Negative for cough and shortness of breath. Cardiovascular: Negative for chest pain. Gastrointestinal: Negative for abdominal pain, nausea and vomiting. Musculoskeletal: Negative for back pain, joint swelling, neck pain and neck stiffness. Skin: Positive for wound. Neurological: Negative for dizziness, tingling, weakness, numbness and headaches. Psychiatric/Behavioral: Negative for confusion. Vitals:    09/21/19 0842   BP: 176/73   Pulse: 90   Resp: 20   Temp: 98.2 °F (36.8 °C)   SpO2: 96%            Physical Exam   Constitutional: She is oriented to person, place, and time. She appears well-developed and well-nourished. Well appearing and in NAD. HENT:   Head: Normocephalic. Atraumatic. Eyes: Pupils are equal, round, and reactive to light. EOM are normal.   Neck: Normal range of motion. Cardiovascular: Normal rate, regular rhythm, normal heart sounds and intact distal pulses. Pulmonary/Chest: Effort normal and breath sounds normal.   CTAB. Abdominal: Soft. There is no tenderness. Soft, NTND. Musculoskeletal: Normal range of motion. She exhibits no edema or deformity.   ~2.5 cm superficial laceration noted to lateral aspect of RLE. No evidence of vascular, tendon or ligamentous injury. No active bleeding. No evidence of foreign body. Full range of motion of right hip, right knee, right ankle. No significant tenderness to palpation. DP pulses 2+ and equal bilaterally. Normal sensory exam.   Neurological: She is alert and oriented to person, place, and time. No cranial nerve deficit or sensory deficit. Strength 5/5 throughout. Normal sensory. Skin: Skin is warm and dry. No rash. Nursing note and vitals reviewed. MDM  Number of Diagnoses or Management Options  Laceration of right lower leg, initial encounter: new and requires workup  Diagnosis management comments: XR  Negative. Td updated. No tendon/ligamentous/vascular injury. Patient tolerated laceration repair well. Total of 11 sutures placed. \  Instructed nurse to place Bacitracin to wound. Instructed to follow-up in 2 days for wound check and in 1 week for suture removal.  Given return precautions. Amount and/or Complexity of Data Reviewed  Tests in the radiology section of CPT®: ordered and reviewed  Tests in the medicine section of CPT®: ordered and reviewed  Review and summarize past medical records: yes  Independent visualization of images, tracings, or specimens: yes    Risk of Complications, Morbidity, and/or Mortality  Presenting problems: moderate  Diagnostic procedures: low  Management options: low    Patient Progress  Patient progress: stable    ED Course as of Sep 21 0953   Sat Sep 21, 2019   0953 XR R tib/fib IMPRESSION: No acute bony abnormality.     [DF]      ED Course User Index  [DF] Blanco Barba MD       Wound Repair  Date/Time: 9/21/2019 10:38 AM  Performed by: attendingPreparation: skin prepped with Betadine  Location details: right leg  Wound length:2.5 cm or less  Anesthesia: local infiltration    Anesthesia:  Local Anesthetic: lidocaine 1% without epinephrine  Anesthetic total: 5 mL  Foreign bodies: no foreign bodies  Irrigation solution: saline  Irrigation method: syringe  Debridement: minimal  Skin closure: 5-0 nylon (5-0 sutures x7; 4-0 sutures x4; 11 total )  Number of sutures: 7  Technique: simple and interrupted  Approximation: close  Dressing: antibiotic ointment  Patient tolerance: Patient tolerated the procedure well with no immediate complications  My total time at bedside, performing this procedure was 1-15 minutes. Ray Sy MD; 9/21/2019 @10:39 AM Voice dictation software was used during the making of this note. This software is not perfect and grammatical and other typographical errors may be present.   This note has not been proofread for errors.  ===================================================================

## 2019-09-21 NOTE — DISCHARGE INSTRUCTIONS
Keep wound clean. Follow-up with primary care doctor in 2 days for wound check and in 1 week for suture removal.    Return if symptoms worsen or progress in any way. Cuts Closed With Stitches: Care Instructions  Your Care Instructions  A cut can happen anywhere on your body. The doctor used stitches to close the cut. Using stitches also helps the cut heal and reduces scarring. Sometimes pieces of tape called Steri-Strips are put over the stitches. If the cut went deep and through the skin, the doctor may have put in two layers of stitches. The deeper layer brings the deep part of the cut together. These stitches will dissolve and don't need to be removed. The stitches in the upper layer are the ones you see on the cut. You will probably have a bandage over the stitches. You will need to have the stitches removed, usually in 7 to 14 days. The doctor has checked you carefully, but problems can develop later. If you notice any problems or new symptoms, get medical treatment right away. Follow-up care is a key part of your treatment and safety. Be sure to make and go to all appointments, and call your doctor if you are having problems. It's also a good idea to know your test results and keep a list of the medicines you take. How can you care for yourself at home? · Keep the cut dry for the first 24 to 48 hours. After this, you can shower if your doctor okays it. Pat the cut dry. · Don't soak the cut, such as in a bathtub. Your doctor will tell you when it's safe to get the cut wet. · If your doctor told you how to care for your cut, follow your doctor's instructions. If you did not get instructions, follow this general advice:  ? After the first 24 to 48 hours, wash around the cut with clean water 2 times a day. Don't use hydrogen peroxide or alcohol, which can slow healing. ? You may cover the cut with a thin layer of petroleum jelly, such as Vaseline, and a nonstick bandage. ?  Apply more petroleum jelly and replace the bandage as needed. · Prop up the sore area on a pillow anytime you sit or lie down during the next 3 days. Try to keep it above the level of your heart. This will help reduce swelling. · Avoid any activity that could cause your cut to reopen. · Do not remove the stitches on your own. Your doctor will tell you when to come back to have the stitches removed. · Leave Steri-Strips on until they fall off. · Be safe with medicines. Read and follow all instructions on the label. ? If the doctor gave you a prescription medicine for pain, take it as prescribed. ? If you are not taking a prescription pain medicine, ask your doctor if you can take an over-the-counter medicine. When should you call for help? Call your doctor now or seek immediate medical care if:    · You have new pain, or your pain gets worse.     · The skin near the cut is cold or pale or changes color.     · You have tingling, weakness, or numbness near the cut.     · The cut starts to bleed, and blood soaks through the bandage. Oozing small amounts of blood is normal.     · You have trouble moving the area near the cut.     · You have symptoms of infection, such as:  ? Increased pain, swelling, warmth, or redness around the cut.  ? Red streaks leading from the cut.  ? Pus draining from the cut.  ? A fever.    Watch closely for changes in your health, and be sure to contact your doctor if:    · The cut reopens.     · You do not get better as expected. Where can you learn more? Go to http://millie-patrice.info/. Enter R217 in the search box to learn more about \"Cuts Closed With Stitches: Care Instructions. \"  Current as of: June 26, 2019  Content Version: 12.2  © 6564-2215 [employer]. Care instructions adapted under license by [employer] (which disclaims liability or warranty for this information).  If you have questions about a medical condition or this instruction, always ask your healthcare professional. [place] [address] any warranty or liability for your use of this information. Patient Education        Wound Check: Care Instructions  Your Care Instructions  People have wounds that need care for many reasons. You may have a cut that needs care after surgery. You may have a cut or puncture wound from an accident. Or you may have a wound because of a condition like diabetes. Whatever the cause of your wound, there are things you can do to care for it at home. Your doctor may also want you to come back for a wound check. The wound check lets the doctor know how your wound is healing and if you need more treatment. Follow-up care is a key part of your treatment and safety. Be sure to make and go to all appointments, and call your doctor if you are having problems. It's also a good idea to know your test results and keep a list of the medicines you take. How can you care for yourself at home? · If your doctor told you how to care for your wound, follow your doctor's instructions. If you did not get instructions, follow this general advice:  ? You may cover the wound with a thin layer of petroleum jelly, such as Vaseline, and a nonstick bandage. ? Apply more petroleum jelly and replace the bandage as needed. · Keep the wound dry for the first 24 to 48 hours. After this, you can shower if your doctor okays it. Pat the wound dry. · Be safe with medicines. Read and follow all instructions on the label. ? If the doctor gave you a prescription medicine for pain, take it as prescribed. ? If you are not taking a prescription pain medicine, ask your doctor if you can take an over-the-counter medicine. · If your doctor prescribed antibiotics, take them as directed. Do not stop taking them just because you feel better. You need to take the full course of antibiotics. · If you have stitches, do not remove them on your own.  Your doctor will tell you when to come back to have them removed. · If you have Steri-Strips, leave them on until they fall off. · If possible, prop up the injured area on a pillow anytime you sit or lie down during the next 3 days. Try to keep it above the level of your heart. This will help reduce swelling. When should you call for help? Call your doctor now or seek immediate medical care if:    · You have new pain, or the pain gets worse.     · The skin near the wound is cold or pale or changes color.     · You have tingling, weakness, or numbness near the wound.     · The wound starts to bleed, and blood soaks through the bandage. Oozing small amounts of blood is normal.     · You have symptoms of infection, such as:  ? Increased pain, swelling, warmth, or redness. ? Red streaks leading from the wound. ? Pus draining from the wound. ? A fever.    Watch closely for changes in your health, and be sure to contact your doctor if:    · You do not get better as expected. Where can you learn more? Go to http://millie-patrice.info/. Enter P342 in the search box to learn more about \"Wound Check: Care Instructions. \"  Current as of: June 26, 2019  Content Version: 12.2  © 9702-6751 [employer]. Care instructions adapted under license by [employer] (which disclaims liability or warranty for this information). If you have questions about a medical condition or this instruction, always ask your healthcare professional. [address] any warranty or liability for your use of this information. Cuts: Care Instructions  Your Care Instructions  A cut can happen anywhere on your body. Stitches, staples, skin adhesives, or pieces of tape called Steri-Strips are sometimes used to keep the edges of a cut together and help it heal. Steri-Strips can be used by themselves or with stitches or staples. Sometimes cuts are left open.   If the cut went deep and through the skin, the doctor may have closed the cut in two layers. A deeper layer of stitches brings the deep part of the cut together. These stitches will dissolve and don't need to be removed. The upper layer closure, which could be stitches, staples, Steri-Strips, or adhesive, is what you see on the cut. A cut is often covered by a bandage. The doctor has checked you carefully, but problems can develop later. If you notice any problems or new symptoms, get medical treatment right away. Follow-up care is a key part of your treatment and safety. Be sure to make and go to all appointments, and call your doctor if you are having problems. It's also a good idea to know your test results and keep a list of the medicines you take. How can you care for yourself at home? If a cut is open or closed  · Prop up the sore area on a pillow anytime you sit or lie down during the next 3 days. Try to keep it above the level of your heart. This will help reduce swelling. · Keep the cut dry for the first 24 to 48 hours. After this, you can shower if your doctor okays it. Pat the cut dry. · Don't soak the cut, such as in a bathtub. Your doctor will tell you when it's safe to get the cut wet. · After the first 24 to 48 hours, clean the cut with soap and water 2 times a day unless your doctor gives you different instructions. ? Don't use hydrogen peroxide or alcohol, which can slow healing. ? You may cover the cut with a thin layer of petroleum jelly and a nonstick bandage. ? If the doctor put a bandage over the cut, put on a new bandage after cleaning the cut or if the bandage gets wet or dirty. · Avoid any activity that could cause your cut to reopen. · Be safe with medicines. Read and follow all instructions on the label. ? If the doctor gave you a prescription medicine for pain, take it as prescribed. ? If you are not taking a prescription pain medicine, ask your doctor if you can take an over-the-counter medicine.   If the cut is closed with stitches, staples, or Steri-Strips  · Follow the above instructions for open or closed cuts. · Do not remove the stitches or staples on your own. Your doctor will tell you when to come back to have the stitches or staples removed. · Leave Steri-Strips on until they fall off. If the cut is closed with a skin adhesive  · Follow the above instructions for open or closed cuts. · Leave the skin adhesive on your skin until it falls off on its own. This may take 5 to 10 days. · Do not scratch, rub, or pick at the adhesive. · Do not put the sticky part of a bandage directly on the adhesive. · Do not put any kind of ointment, cream, or lotion over the area. This can make the adhesive fall off too soon. Do not use hydrogen peroxide or alcohol, which can slow healing. When should you call for help? Call your doctor now or seek immediate medical care if:    · You have new pain, or your pain gets worse.     · The skin near the cut is cold or pale or changes color.     · You have tingling, weakness, or numbness near the cut.     · The cut starts to bleed, and blood soaks through the bandage. Oozing small amounts of blood is normal.     · You have trouble moving the area near the cut.     · You have symptoms of infection, such as:  ? Increased pain, swelling, warmth, or redness around the cut.  ? Red streaks leading from the cut.  ? Pus draining from the cut.  ? A fever.    Watch closely for changes in your health, and be sure to contact your doctor if:    · The cut reopens.     · You do not get better as expected. Where can you learn more? Go to http://millie-patrice.info/. Enter M735 in the search box to learn more about \"Cuts: Care Instructions. \"  Current as of: June 26, 2019  Content Version: 12.2  © 3221-4355 [employer]. Care instructions adapted under license by [employer] (which disclaims liability or warranty for this information).  If you have questions about a medical condition or this instruction, always ask your healthcare professional. [place] [address] any warranty or liability for your use of this information.

## 2019-09-21 NOTE — ED NOTES
Patient presents via EMS from [facility] [place] [address]. Per EMS she was attempting to transfer from bed to wheelchair and stumbled hitting her leg on the wheelchair sustaining a lac to the right lower leg approximately 3 cm long. Patient has hx of dementia. Does not appear in any apparent distress at this time.

## 2019-09-21 NOTE — ED NOTES
I have reviewed discharge instructions with the patient. The patient verbalized understanding. Patient left ED via Discharge Method: stretcher to SNF with [facility]. Opportunity for questions and clarification provided. Patient given 0 scripts. To continue your aftercare when you leave the hospital, you may receive an automated call from our care team to check in on how you are doing. This is a free service and part of our promise to provide the best care and service to meet your aftercare needs.  If you have questions, or wish to unsubscribe from this service please call [phone]. Thank you for Choosing our [facility] Emergency Department.

## 2019-10-16 ENCOUNTER — HOSPITAL ENCOUNTER (EMERGENCY)
Age: 84
Discharge: HOME | End: 2019-10-16
Attending: EMERGENCY MEDICINE
Payer: MEDICARE

## 2019-10-16 ENCOUNTER — APPOINTMENT (OUTPATIENT)
Dept: GENERAL RADIOLOGY | Age: 84
End: 2019-10-16
Attending: EMERGENCY MEDICINE
Payer: MEDICARE

## 2019-10-16 VITALS
HEART RATE: 74 BPM | DIASTOLIC BLOOD PRESSURE: 64 MMHG | RESPIRATION RATE: 16 BRPM | OXYGEN SATURATION: 96 % | WEIGHT: 130 LBS | TEMPERATURE: 98.1 F | HEIGHT: 61 IN | SYSTOLIC BLOOD PRESSURE: 110 MMHG | BODY MASS INDEX: 24.55 KG/M2

## 2019-10-16 DIAGNOSIS — S62.102A CLOSED FRACTURE OF LEFT WRIST, INITIAL ENCOUNTER: Primary | ICD-10-CM

## 2019-10-16 PROCEDURE — 99283 EMERGENCY DEPT VISIT LOW MDM: CPT | Performed by: EMERGENCY MEDICINE

## 2019-10-16 PROCEDURE — 73110 X-RAY EXAM OF WRIST: CPT

## 2019-10-16 RX ORDER — GABAPENTIN 100 MG/1
CAPSULE ORAL 2 TIMES DAILY
COMMUNITY

## 2019-10-16 RX ORDER — DOCUSATE SODIUM 100 MG/1
100 CAPSULE, LIQUID FILLED ORAL 2 TIMES DAILY
COMMUNITY

## 2019-10-16 RX ORDER — RANITIDINE 150 MG/1
150 CAPSULE ORAL 2 TIMES DAILY
COMMUNITY

## 2019-10-16 RX ORDER — METHOTREXATE 2.5 MG/1
12.5 TABLET ORAL
COMMUNITY

## 2019-10-16 RX ORDER — LISINOPRIL 2.5 MG/1
TABLET ORAL DAILY
COMMUNITY

## 2019-10-16 NOTE — DISCHARGE INSTRUCTIONS
Follow up with Orthopedic for evaluation of wrist fracture. Elevate the arm.    Return if worsening symptoms

## 2019-10-16 NOTE — ED NOTES
I have reviewed discharge instructions with the patient. The patient verbalized understanding. Patient left ED via Discharge Method: wheelchair to Home with family. Opportunity for questions and clarification provided. Patient given 0 scripts. To continue your aftercare when you leave the hospital, you may receive an automated call from our care team to check in on how you are doing. This is a free service and part of our promise to provide the best care and service to meet your aftercare needs.  If you have questions, or wish to unsubscribe from this service please call [phone]. Thank you for Choosing our [facility] Emergency Department.

## 2019-10-16 NOTE — ED TRIAGE NOTES
Swelling with bruising to left wrist since Monday evening after fall. Xray done at nursing home and a radial fracture is noted.

## 2019-10-16 NOTE — ED PROVIDER NOTES
HPI:  80 F, here with left wrist pain. Brennan Chua 2 days ago at nursing home. Left arm swelling and pain. Had mobile Xray this Am and told fracture wrist. Bought here for evaluation. No headache. Acting normal. No stroke like symptoms. No fever. No urinary pain. ROS  Constitutional: No fever, no chills  Skin: no rash  Eye:   ENMT:   Respiratory: No shortness of breath, no cough  Cardiovascular: No chest pain  Gastrointestinal: No vomiting, no nausea, no abdominal pain  : No dysuria  MSK: No back pain, no muscle pain, + joint pain  Neuro: No headache, no change in mental status, no numbness, no tingling, no weakness  Psych:   Endocrine:   All other review of systems positive per history of present illness and the above otherwise negative or noncontributory. Visit Vitals  /56   Pulse 89   Temp 98.7 °F (37.1 °C)   Resp 16   Ht 5' 1\" (1.549 m)   Wt 59 kg (130 lb)   SpO2 94%   BMI 24.56 kg/m²     Past Medical History:   Diagnosis Date    Essential hypertension 7/8/2018    Gastrointestinal disorder     GERD    Ill-defined condition     cellulitis of legs    UTI (urinary tract infection) 7/8/2018     Past Surgical History:   Procedure Laterality Date    HX HIP REPLACEMENT Bilateral      Prior to Admission Medications   Prescriptions Last Dose Informant Patient Reported? Taking?   acetaminophen (TYLENOL) 325 mg tablet   Yes No   Sig: Take 650 mg by mouth every six (6) hours as needed for Pain. amLODIPine (NORVASC) 5 mg tablet   No No   Sig: Take 1 Tab by mouth daily. docusate sodium (COLACE) 100 mg capsule   Yes Yes   Sig: Take 100 mg by mouth two (2) times a day.   gabapentin (NEURONTIN) 100 mg capsule   Yes Yes   Sig: Take  by mouth two (2) times a day. lisinopril (PRINIVIL, ZESTRIL) 2.5 mg tablet   Yes Yes   Sig: Take  by mouth daily. methotrexate (RHEUMATREX) 2.5 mg tablet   Yes Yes   Sig: Take 12.5 mg by mouth every Monday.    raNITIdine hcl 150 mg capsule   Yes Yes   Sig: Take 150 mg by mouth two (2) times a day. Facility-Administered Medications: None         Adult Exam   General: alert, no acute distress  Head: normocephalic, atraumatic  ENT: moist mucous membranes  Neck: supple, non-tender; full range of motion  Cardiovascular: regular rate and rhythm, normal peripheral perfusion, no edema  Respiratory:  normal respirations  Gastrointestinal: soft, non-tender  Back: non-tender, full range of motion  Musculoskeletal: decreased range of motion at the left wrist, normal strength  Left wrist with swelling and ecchymoses. Tenderness to palpation at the left wrist.   Distal pulses intact. Good cap refill. Neurological: alert and oriented x 4, no gross focal deficits; normal speech  Psychiatric: cooperative; appropriate mood and affect    MDM:  Placed in sugar tong splint for left wrist.   tyl and motrin for pain at home. Dc home and f/u with ortho. Low susp for acute neuro-vascular compromised. Xr Wrist Lt Ap/lat/obl Min 3v    Result Date: 10/16/2019  Left wrist series HISTORY: Pain and bruising after fall a couple of days ago. 3 views of the left wrist were obtained. No prior studies are available for comparison. FINDINGS: There is a comminuted and mildly impacted fracture involving the distal radial metaphysis. There is a mildly displaced ulnar styloid fracture. There is associated soft tissue swelling. There is no bony destruction. There is diffuse osteopenia. IMPRESSION: Distal radial metaphyseal and ulnar styloid fractures. No results found for this or any previous visit (from the past 24 hour(s)). Dragon voice recognition software was used to create this note. Although the note has been reviewed and corrected where necessary, additional errors may have been overlooked and remain in the text.

## 2021-12-08 ENCOUNTER — APPOINTMENT (OUTPATIENT)
Dept: GENERAL RADIOLOGY | Age: 86
End: 2021-12-08
Attending: EMERGENCY MEDICINE
Payer: MEDICARE

## 2021-12-08 ENCOUNTER — HOSPITAL ENCOUNTER (EMERGENCY)
Age: 86
Discharge: HOME | End: 2021-12-08
Attending: EMERGENCY MEDICINE
Payer: MEDICARE

## 2021-12-08 VITALS
HEART RATE: 88 BPM | HEIGHT: 61 IN | WEIGHT: 120 LBS | TEMPERATURE: 98.5 F | SYSTOLIC BLOOD PRESSURE: 116 MMHG | DIASTOLIC BLOOD PRESSURE: 65 MMHG | OXYGEN SATURATION: 96 % | BODY MASS INDEX: 22.66 KG/M2 | RESPIRATION RATE: 20 BRPM

## 2021-12-08 DIAGNOSIS — R53.1 WEAKNESS: Primary | ICD-10-CM

## 2021-12-08 DIAGNOSIS — E86.0 DEHYDRATION: ICD-10-CM

## 2021-12-08 LAB
ALBUMIN SERPL-MCNC: 3.3 G/DL (ref 3.2–4.6)
ALBUMIN/GLOB SERPL: 0.7 {RATIO} (ref 1.2–3.5)
ALP SERPL-CCNC: 71 U/L (ref 50–136)
ALT SERPL-CCNC: 38 U/L (ref 12–65)
ANION GAP SERPL CALC-SCNC: 8 MMOL/L (ref 7–16)
APPEARANCE UR: ABNORMAL
AST SERPL-CCNC: 27 U/L (ref 15–37)
BACTERIA URNS QL MICRO: ABNORMAL /HPF
BASOPHILS # BLD: 0 K/UL (ref 0–0.2)
BASOPHILS NFR BLD: 0 % (ref 0–2)
BILIRUB SERPL-MCNC: 0.5 MG/DL (ref 0.2–1.1)
BILIRUB UR QL: NEGATIVE
BUN SERPL-MCNC: 46 MG/DL (ref 8–23)
CALCIUM SERPL-MCNC: 10.4 MG/DL (ref 8.3–10.4)
CASTS URNS QL MICRO: 0 /LPF
CHLORIDE SERPL-SCNC: 109 MMOL/L (ref 98–107)
CK SERPL-CCNC: 18 U/L (ref 21–215)
CO2 SERPL-SCNC: 29 MMOL/L (ref 21–32)
COLOR UR: YELLOW
CREAT SERPL-MCNC: 1.25 MG/DL (ref 0.6–1)
DIFFERENTIAL METHOD BLD: ABNORMAL
EOSINOPHIL # BLD: 0.1 K/UL (ref 0–0.8)
EOSINOPHIL NFR BLD: 1 % (ref 0.5–7.8)
EPI CELLS #/AREA URNS HPF: ABNORMAL /HPF
ERYTHROCYTE [DISTWIDTH] IN BLOOD BY AUTOMATED COUNT: 16.8 % (ref 11.9–14.6)
GLOBULIN SER CALC-MCNC: 4.5 G/DL (ref 2.3–3.5)
GLUCOSE SERPL-MCNC: 109 MG/DL (ref 65–100)
GLUCOSE UR STRIP.AUTO-MCNC: NEGATIVE MG/DL
HCT VFR BLD AUTO: 37.8 % (ref 35.8–46.3)
HGB BLD-MCNC: 11.8 G/DL (ref 11.7–15.4)
HGB UR QL STRIP: ABNORMAL
IMM GRANULOCYTES # BLD AUTO: 0 K/UL (ref 0–0.5)
IMM GRANULOCYTES NFR BLD AUTO: 0 % (ref 0–5)
KETONES UR QL STRIP.AUTO: 40 MG/DL
LEUKOCYTE ESTERASE UR QL STRIP.AUTO: ABNORMAL
LYMPHOCYTES # BLD: 1 K/UL (ref 0.5–4.6)
LYMPHOCYTES NFR BLD: 13 % (ref 13–44)
MCH RBC QN AUTO: 29.7 PG (ref 26.1–32.9)
MCHC RBC AUTO-ENTMCNC: 31.2 G/DL (ref 31.4–35)
MCV RBC AUTO: 95.2 FL (ref 79.6–97.8)
MONOCYTES # BLD: 0.4 K/UL (ref 0.1–1.3)
MONOCYTES NFR BLD: 5 % (ref 4–12)
NEUTS SEG # BLD: 5.9 K/UL (ref 1.7–8.2)
NEUTS SEG NFR BLD: 80 % (ref 43–78)
NITRITE UR QL STRIP.AUTO: NEGATIVE
NRBC # BLD: 0 K/UL (ref 0–0.2)
PH UR STRIP: 5 [PH] (ref 5–9)
PLATELET # BLD AUTO: 161 K/UL (ref 150–450)
PMV BLD AUTO: 10.7 FL (ref 9.4–12.3)
POTASSIUM SERPL-SCNC: 4.7 MMOL/L (ref 3.5–5.1)
PROT SERPL-MCNC: 7.8 G/DL (ref 6.3–8.2)
PROT UR STRIP-MCNC: ABNORMAL MG/DL
RBC # BLD AUTO: 3.97 M/UL (ref 4.05–5.2)
RBC #/AREA URNS HPF: ABNORMAL /HPF
SODIUM SERPL-SCNC: 146 MMOL/L (ref 136–145)
SP GR UR REFRACTOMETRY: 1.01 (ref 1–1.02)
UROBILINOGEN UR QL STRIP.AUTO: 0.2 EU/DL (ref 0.2–1)
WBC # BLD AUTO: 7.4 K/UL (ref 4.3–11.1)
WBC URNS QL MICRO: >100 /HPF

## 2021-12-08 PROCEDURE — 82550 ASSAY OF CK (CPK): CPT

## 2021-12-08 PROCEDURE — 96360 HYDRATION IV INFUSION INIT: CPT

## 2021-12-08 PROCEDURE — 99285 EMERGENCY DEPT VISIT HI MDM: CPT

## 2021-12-08 PROCEDURE — 85025 COMPLETE CBC W/AUTO DIFF WBC: CPT

## 2021-12-08 PROCEDURE — 71045 X-RAY EXAM CHEST 1 VIEW: CPT

## 2021-12-08 PROCEDURE — 87186 SC STD MICRODIL/AGAR DIL: CPT

## 2021-12-08 PROCEDURE — 87088 URINE BACTERIA CULTURE: CPT

## 2021-12-08 PROCEDURE — 74011000250 HC RX REV CODE- 250: Performed by: EMERGENCY MEDICINE

## 2021-12-08 PROCEDURE — 74011250636 HC RX REV CODE- 250/636: Performed by: EMERGENCY MEDICINE

## 2021-12-08 PROCEDURE — 87086 URINE CULTURE/COLONY COUNT: CPT

## 2021-12-08 PROCEDURE — 96372 THER/PROPH/DIAG INJ SC/IM: CPT

## 2021-12-08 PROCEDURE — 81001 URINALYSIS AUTO W/SCOPE: CPT

## 2021-12-08 PROCEDURE — 93005 ELECTROCARDIOGRAM TRACING: CPT

## 2021-12-08 PROCEDURE — 80053 COMPREHEN METABOLIC PANEL: CPT

## 2021-12-08 RX ORDER — ONDANSETRON 4 MG/1
4 TABLET, ORALLY DISINTEGRATING ORAL
Qty: 12 TABLET | Refills: 0 | Status: SHIPPED | OUTPATIENT
Start: 2021-12-08

## 2021-12-08 RX ORDER — CEFDINIR 300 MG/1
300 CAPSULE ORAL 2 TIMES DAILY
Qty: 14 CAPSULE | Refills: 0 | Status: SHIPPED | OUTPATIENT
Start: 2021-12-08 | End: 2021-12-15

## 2021-12-08 RX ADMIN — SODIUM CHLORIDE 1000 ML: 900 INJECTION, SOLUTION INTRAVENOUS at 15:29

## 2021-12-08 RX ADMIN — LIDOCAINE HYDROCHLORIDE 1 G: 10 INJECTION, SOLUTION INFILTRATION; PERINEURAL at 18:04

## 2021-12-08 NOTE — ED NOTES
TRANSFER - OUT REPORT:    Verbal report given to SMILEY Orourke on Romulo Ye  being transferred to [facility] for routine progression of care       Report consisted of patients Situation, Background, Assessment and   Recommendations(SBAR). Information from the following report(s) ED Summary, MAR and Med Rec Status was reviewed with the receiving nurse. Lines:       Opportunity for questions and clarification was provided.       Patient transported with:   [facility]

## 2021-12-08 NOTE — DISCHARGE INSTRUCTIONS
Patient seemed somewhat dehydrated but otherwise unremarkable work-up. Going to cover the patient with CONTRERAS TOMMY which is a slightly better antibiotic for this possible left lower lobe pneumonia but the patient did have a white count, no fever, no cough and her oxygen was normal.  Given that we have excessive numbers of patients boarding in the emergency department and her advanced dementia I feel like it is better to treat this patient as an outpatient so that she can stay in a more controlled environment.

## 2021-12-08 NOTE — ED PROVIDER NOTES
54-year-old female with advanced dementia presenting for decreased p.o. intake. All of the story is given by EMS and the nursing home  Decreased appetite, fluid intake and food intake for the past few days  Currently being treated for pneumonia  Patient has no complaints at this time      Altered mental status          Past Medical History:   Diagnosis Date    Essential hypertension 7/8/2018    Gastrointestinal disorder     GERD    Ill-defined condition     cellulitis of legs    UTI (urinary tract infection) 7/8/2018       Past Surgical History:   Procedure Laterality Date    HX HIP REPLACEMENT Bilateral          Family History:   Problem Relation Age of Onset    Colon Cancer Father     Other Brother         Aortic valve replacement       Social History     Socioeconomic History    Marital status:      Spouse name: Not on file    Number of children: Not on file    Years of education: Not on file    Highest education level: Not on file   Occupational History    Not on file   Tobacco Use    Smoking status: Never Smoker    Smokeless tobacco: Never Used   Substance and Sexual Activity    Alcohol use: No    Drug use: No    Sexual activity: Not on file   Other Topics Concern    Not on file   Social History Narrative    Not on file     Social Determinants of Health     Financial Resource Strain:     Difficulty of Paying Living Expenses: Not on file   Food Insecurity:     Worried About [address] in the Last Year: Not on file    [address] in the Last Year: Not on file   Transportation Needs:     Lack of Transportation (Medical): Not on file    Lack of Transportation (Non-Medical):  Not on file   Physical Activity:     Days of Exercise per Week: Not on file    Minutes of Exercise per Session: Not on file   Stress:     Feeling of Stress : Not on file   Social Connections:     Frequency of Communication with Friends and Family: Not on file    Frequency of Social Gatherings with Friends and Family: Not on file    Attends Buddhist Services: Not on file    Active Member of Clubs or Organizations: Not on file    Attends Club or Organization Meetings: Not on file    Marital Status: Not on file   Intimate Partner Violence:     Fear of Current or Ex-Partner: Not on file    Emotionally Abused: Not on file    Physically Abused: Not on file    Sexually Abused: Not on file   Housing Stability:     Unable to Pay for Housing in the Last Year: Not on file    Number of [place] in the Last Year: Not on file    Unstable Housing in the Last Year: Not on file         ALLERGIES: Atorvastatin, Fentanyl, and Ms contin [morphine]    Review of Systems   Unable to perform ROS: Dementia   Constitutional: Positive for activity change, appetite change and chills. All other systems reviewed and are negative. Vitals:    12/08/21 1328   BP: 116/65   Pulse: 88   Resp: 20   Temp: 98.5 °F (36.9 °C)   SpO2: 96%   Weight: 54.4 kg (120 lb)   Height: 5' 1\" (1.549 m)            Physical Exam  Vitals and nursing note reviewed. Constitutional:       Comments: Frail-appearing   HENT:      Head: Normocephalic and atraumatic. Mouth/Throat:      Comments: Poor dentition, dried cracked lips, slightly dry tongue  Eyes:      Extraocular Movements: Extraocular movements intact. Conjunctiva/sclera: Conjunctivae normal.      Pupils: Pupils are equal, round, and reactive to light. Cardiovascular:      Rate and Rhythm: Regular rhythm. Tachycardia present. Pulmonary:      Effort: Pulmonary effort is normal.      Breath sounds: Normal breath sounds. Abdominal:      General: Bowel sounds are normal.      Palpations: Abdomen is soft. Musculoskeletal:         General: Normal range of motion. Cervical back: Normal range of motion and neck supple. Skin:     General: Skin is warm and dry. Neurological:      Mental Status: She is alert.           MDM  Number of Diagnoses or Management Options  Dehydration  Weakness  Diagnosis management comments: 28-year-old female with decreased interactiveness in the setting of a reported active infection. We will check basic labs, urinalysis chest x-ray. Amount and/or Complexity of Data Reviewed  Clinical lab tests: ordered and reviewed (Results for orders placed or performed during the hospital encounter of 12/08/21  -CBC WITH AUTOMATED DIFF:        Result                      Value             Ref Range           WBC                         7.4               4.3 - 11.1 K*       RBC                         3.97 (L)          4.05 - 5.2 M*       HGB                         11.8              11.7 - 15.4 *       HCT                         37.8              35.8 - 46.3 %       MCV                         95.2              79.6 - 97.8 *       MCH                         29.7              26.1 - 32.9 *       MCHC                        31.2 (L)          31.4 - 35.0 *       RDW                         16.8 (H)          11.9 - 14.6 %       PLATELET                    161               150 - 450 K/*       MPV                         10.7              9.4 - 12.3 FL       ABSOLUTE NRBC               0.00              0.0 - 0.2 K/*       DF                          AUTOMATED                             NEUTROPHILS                 80 (H)            43 - 78 %           LYMPHOCYTES                 13                13 - 44 %           MONOCYTES                   5                 4.0 - 12.0 %        EOSINOPHILS                 1                 0.5 - 7.8 %         BASOPHILS                   0                 0.0 - 2.0 %         IMMATURE GRANULOCYTES       0                 0.0 - 5.0 %         ABS. NEUTROPHILS            5.9               1.7 - 8.2 K/*       ABS. LYMPHOCYTES            1.0               0.5 - 4.6 K/*       ABS. MONOCYTES              0.4               0.1 - 1.3 K/*       ABS. EOSINOPHILS            0.1               0.0 - 0.8 K/*       ABS.  BASOPHILS 0.0               0.0 - 0.2 K/*       ABS. IMM. GRANS.            0.0               0.0 - 0.5 K/*  -METABOLIC PANEL, COMPREHENSIVE:        Result                      Value             Ref Range           Sodium                      146 (H)           136 - 145 mm*       Potassium                   4.7               3.5 - 5.1 mm*       Chloride                    109 (H)           98 - 107 mmo*       CO2                         29                21 - 32 mmol*       Anion gap                   8                 7 - 16 mmol/L       Glucose                     109 (H)           65 - 100 mg/*       BUN                         46 (H)            8 - 23 MG/DL        Creatinine                  1.25 (H)          0.6 - 1.0 MG*       GFR est AA                  51 (L)            >60 ml/min/1*       GFR est non-AA              42 (L)            >60 ml/min/1*       Calcium                     10.4              8.3 - 10.4 M*       Bilirubin, total            0.5               0.2 - 1.1 MG*       ALT (SGPT)                  38                12 - 65 U/L         AST (SGOT)                  27                15 - 37 U/L         Alk. phosphatase            71                50 - 136 U/L        Protein, total              7.8               6.3 - 8.2 g/*       Albumin                     3.3               3.2 - 4.6 g/*       Globulin                    4.5 (H)           2.3 - 3.5 g/*       A-G Ratio                   0.7 (L)           1.2 - 3.5      -CK:        Result                      Value             Ref Range           CK                          18 (L)            21 - 215 U/L   )  Tests in the radiology section of CPT®: ordered and reviewed (XR CHEST PORT    Result Date: 12/8/2021  CHEST X-RAY, one view. INDICATION:  Pneumonia. Decreased appetite. TECHNIQUE:  AP upright portable view. COMPARISON: July 2018 FINDINGS: Lungs: Infiltrate left base obscuring the lateral left hemidiaphragm. Right lung is clear. Costophrenic angles: Blunted on the left. Heart size: is normal. Pulmonary vasculature: is unremarkable. Aorta: Arch calcifications. Included portion of the upper abdomen: is unremarkable. Bones: No gross bony lesions. Other: None. Infiltrate left base, possibly pneumonia.     )  Tests in the medicine section of CPT®: ordered and reviewed  Decide to obtain previous medical records or to obtain history from someone other than the patient: yes  Independent visualization of images, tracings, or specimens: yes    Risk of Complications, Morbidity, and/or Mortality  Presenting problems: high  Diagnostic procedures: moderate  Management options: moderate  General comments: Patient's remained hemodynamically stable. She is awake alert but resting. Vital signs are normal.  Blood work is at her baseline except for an elevated BUN and creatinine consistent with dehydration. I am going to empirically treat the patient with 7 days of Omnicef given advanced age but I think that she would be best served going back to her facility with the current circumstances in the hospital, no beds, excessive boarding in the department, and high risk for agitation. I personally reviewed the patient's vital signs, laboratory tests, and/or radiological findings. I discussed these findings with the patient and their significance. I answered all questions and gave the patient clear return precautions. The patient was discharged from the emergency department in stable condition        Patient Progress  Patient progress: improved    ED Course as of 12/08/21 1645   Wed Dec 08, 2021   1356 EKG performed shows sinus rhythm rate 86, left axis deviation, normal intervals and no acute ischemic change. [JS]   1500 Patient with mild hypernatremia and TODD. Work-up thus far consistent with dehydration. Adding a liter of fluids and a CK. [JS]   1500 Really just waiting on urinalysis but the patient has remained hemodynamically stable.  [JS]   9637 Irregular infiltrate in the left lower lobe patient has no fever, no white count, no cough.  [JS]      ED Course User Index  [JS] Ji Ureña MD       Procedures

## 2021-12-08 NOTE — ED TRIAGE NOTES
Pt to ED from [address] living for AMS. PMH demetia. Pt no eating or drinking for several days. Pt currently being treated for pneumonia. No recent fevers, productive cough. VS: 120/60, 80 HR, 97% on room air, .   masked

## 2021-12-09 LAB
ATRIAL RATE: 106 BPM
CALCULATED R AXIS, ECG10: -22 DEGREES
CALCULATED T AXIS, ECG11: 6 DEGREES
DIAGNOSIS, 93000: NORMAL
Q-T INTERVAL, ECG07: 338 MS
QRS DURATION, ECG06: 76 MS
QTC CALCULATION (BEZET), ECG08: 404 MS
VENTRICULAR RATE, ECG03: 86 BPM

## 2021-12-12 LAB
BACTERIA SPEC CULT: ABNORMAL
BACTERIA SPEC CULT: ABNORMAL
SERVICE CMNT-IMP: ABNORMAL

## 2021-12-13 NOTE — PROGRESS NOTES
ED pharmacist reviewed recent results of urine culture. The patient was treated with ceftriaxone in the emergency department and received a prescription for [facility] upon discharge. Based on culture results, the patient requires alternative antimicrobial therapy. Discussed case with Dr. Jolene Beach. A prescription for Augmentin has been called into [facility] on [address] [place] [address] per Dr. Lakshmi Bianchi. The patient has been advised to follow-up with their primary care provider or return to the ED if symptoms do not resolve or worsen. Allergies as of 12/08/2021 - Fully Reviewed 12/08/2021   Allergen Reaction Noted    Atorvastatin Unknown (comments) 10/16/2019    Fentanyl Unknown (comments) 10/16/2019    Ms contin [morphine] Unknown (comments) 10/16/2019     Gela Celaya, PharmD.   Emergency Medicine Clinical Pharmacist